# Patient Record
Sex: FEMALE | Race: WHITE | NOT HISPANIC OR LATINO | Employment: OTHER | URBAN - METROPOLITAN AREA
[De-identification: names, ages, dates, MRNs, and addresses within clinical notes are randomized per-mention and may not be internally consistent; named-entity substitution may affect disease eponyms.]

---

## 2017-04-24 ENCOUNTER — TELEPHONE (OUTPATIENT)
Dept: OBSTETRICS AND GYNECOLOGY | Facility: CLINIC | Age: 62
End: 2017-04-24

## 2017-04-24 DIAGNOSIS — Z12.31 VISIT FOR SCREENING MAMMOGRAM: Primary | ICD-10-CM

## 2017-04-24 NOTE — TELEPHONE ENCOUNTER
----- Message from Leatha Forman sent at 4/21/2017  2:40 PM EDT -----  Contact: Patient  Patient needs her routine mammo referral/order sent to [facility].     Thanks,  Leatha     Order sent to [facility]

## 2017-09-19 ENCOUNTER — OFFICE VISIT (OUTPATIENT)
Dept: OBSTETRICS AND GYNECOLOGY | Facility: CLINIC | Age: 62
End: 2017-09-19

## 2017-09-19 VITALS
BODY MASS INDEX: 21.16 KG/M2 | HEART RATE: 73 BPM | DIASTOLIC BLOOD PRESSURE: 73 MMHG | HEIGHT: 62 IN | WEIGHT: 115 LBS | SYSTOLIC BLOOD PRESSURE: 121 MMHG

## 2017-09-19 DIAGNOSIS — Z01.419 PAP SMEAR, AS PART OF ROUTINE GYNECOLOGICAL EXAMINATION: Primary | ICD-10-CM

## 2017-09-19 DIAGNOSIS — Z01.419 ENCOUNTER FOR GYNECOLOGICAL EXAMINATION WITHOUT ABNORMAL FINDING: ICD-10-CM

## 2017-09-19 DIAGNOSIS — Z12.11 COLON CANCER SCREENING: ICD-10-CM

## 2017-09-19 DIAGNOSIS — Z78.0 MENOPAUSE: ICD-10-CM

## 2017-09-19 DIAGNOSIS — M85.80 OSTEOPENIA: ICD-10-CM

## 2017-09-19 LAB — HEMOCCULT STL QL IA: NEGATIVE

## 2017-09-19 PROCEDURE — 82274 ASSAY TEST FOR BLOOD FECAL: CPT | Performed by: OBSTETRICS & GYNECOLOGY

## 2017-09-19 PROCEDURE — 99396 PREV VISIT EST AGE 40-64: CPT | Performed by: OBSTETRICS & GYNECOLOGY

## 2017-09-19 RX ORDER — MULTIVITAMIN
1 TABLET ORAL DAILY
COMMUNITY

## 2017-09-19 NOTE — PROGRESS NOTES
Subjective   Kaci Patrick is a 61 y.o. female  3, Para 2 AB 1, Living 2.  Last annual 2016, last pap >2y, last mammogram 2017, last colonoscopy recent.  DEXA-10/2016 osteopenia stable  CC: Annual exam  History of Present Illness  Denies any gynecologic problems at this time.  Review of DEXA scan showed osteopenia as noted above stable on no medications.  Explained extrapolation to lower back considering her spinal fusion and hardware and feel no therapy is indicated at this time.  Mammogram was negative in May  The following portions of the patient's history were reviewed and updated as appropriate: allergies, current medications, past family history, past medical history, past social history, past surgical history and problem list.    Review of Systems   Constitutional: Negative for activity change, fatigue, fever and unexpected weight change.   HENT: Negative for hearing loss, sore throat and voice change.    Respiratory: Negative for cough, chest tightness, shortness of breath and wheezing.    Cardiovascular: Negative for chest pain, palpitations and leg swelling.   Gastrointestinal: Negative for abdominal distention, abdominal pain, anal bleeding, blood in stool, constipation, diarrhea, nausea, rectal pain and vomiting.   Endocrine: Negative for cold intolerance and heat intolerance.   Genitourinary: Negative for difficulty urinating, dyspareunia, dysuria, flank pain, frequency, genital sores, menstrual problem, pelvic pain, urgency, vaginal bleeding, vaginal discharge and vaginal pain.   Musculoskeletal: Negative for arthralgias and back pain.   Skin: Negative for rash.   Allergic/Immunologic: Negative for environmental allergies and food allergies.   Neurological: Negative for dizziness, tremors, syncope, weakness, light-headedness, numbness and headaches.   Hematological: Negative for adenopathy. Does not bruise/bleed easily.   Psychiatric/Behavioral: Negative for agitation, behavioral problems,  "self-injury, sleep disturbance and suicidal ideas. The patient is not nervous/anxious and is not hyperactive.          Past Medical History:   Diagnosis Date   • Arthritis    • Asthma due to seasonal allergies    • Cervical dysplasia    • Osteopenia      Menstrual History:  OB History      Para Term  AB TAB SAB Ectopic Multiple Living    3 2 2  1  1   2         Menarche age:10  No LMP recorded. Patient is postmenopausal.       Past Surgical History:   Procedure Laterality Date   • APPENDECTOMY     • CERVICAL CONE BIOPSY     • CERVICAL POLYPECTOMY     • SPINAL FUSION       OB History      Para Term  AB TAB SAB Ectopic Multiple Living    3 2 2  1  1   2        Family History   Problem Relation Age of Onset   • Colon cancer Father    • Prostate cancer Father      History   Smoking Status   • Former Smoker   Smokeless Tobacco   • Never Used     History   Alcohol Use No     Health Maintenance   Topic Date Due   • TDAP/TD VACCINES (1 - Tdap) 1972   • HEPATITIS C SCREENING  10/05/2016   • ZOSTER VACCINE  10/05/2016   • PAP SMEAR  2016   • INFLUENZA VACCINE  2017   • MAMMOGRAM  2018   • DXA SCAN  10/05/2018   • COLONOSCOPY  2022       Current Outpatient Prescriptions:   •  amitriptyline (ELAVIL) 25 MG tablet, Take  by mouth., Disp: , Rfl:   •  calcium citrate-vitamin D (CITRACAL+D) 315-200 MG-UNIT per tablet, Take 1 tablet by mouth., Disp: , Rfl:   •  fluticasone (FLONASE) 50 MCG/ACT nasal spray, into each nostril., Disp: , Rfl:   •  meloxicam (MOBIC) 15 MG tablet, , Disp: , Rfl:   •  montelukast (SINGULAIR) 10 MG tablet, , Disp: , Rfl:   •  Multiple Vitamin (MULTIVITAMIN) tablet, Take 1 tablet by mouth., Disp: , Rfl:   Sexual History:Active  STD: HPV  .       Objective   Vitals:    17 1143   BP: 121/73   Pulse: 73   Weight: 115 lb (52.2 kg)   Height: 62\" (157.5 cm)     Physical Exam   Constitutional: She is oriented to person, place, and time. She appears " well-developed and well-nourished.   HENT:   Head: Normocephalic.   Eyes: Pupils are equal, round, and reactive to light.   Neck: Normal range of motion. No thyromegaly present.   Cardiovascular: Normal rate, regular rhythm, normal heart sounds and intact distal pulses.    Pulmonary/Chest: Effort normal and breath sounds normal. No respiratory distress. She exhibits no tenderness. Right breast exhibits no inverted nipple, no mass, no nipple discharge, no skin change and no tenderness. Left breast exhibits no inverted nipple, no mass, no nipple discharge, no skin change and no tenderness. Breasts are symmetrical.   Abdominal: Soft. Bowel sounds are normal. Hernia confirmed negative in the right inguinal area and confirmed negative in the left inguinal area.   Genitourinary: Rectum normal, vagina normal and uterus normal. Rectal exam shows no external hemorrhoid, no internal hemorrhoid, no fissure, no mass, no tenderness, anal tone normal and guaiac negative stool. No breast tenderness or discharge. Pelvic exam was performed with patient supine. There is no rash, tenderness, lesion or injury on the right labia. There is no rash, tenderness, lesion or injury on the left labia. Uterus is not enlarged and not tender. Cervix exhibits no motion tenderness, no discharge and no friability. Right adnexum displays no mass, no tenderness and no fullness. Left adnexum displays no mass, no tenderness and no fullness.   Lymphadenopathy:     She has no cervical adenopathy.        Right: No inguinal adenopathy present.        Left: No inguinal adenopathy present.   Neurological: She is alert and oriented to person, place, and time. She has normal reflexes.   Skin: Skin is warm and dry.   Psychiatric: She has a normal mood and affect. Her behavior is normal. Judgment and thought content normal.         Assessment/Plan   Kaci was seen today for gynecologic exam.    Diagnoses and all orders for this visit:    Pap smear, as part of  routine gynecological examination  -     IGP, Apt HPV,rfx 16 / 18,45    Menopause    Encounter for gynecological examination without abnormal finding    Osteopenia  Comments:  As her findings are stable will be no intervention at this time      Patient counseled about osteopenia, calcium usage, breast self-examination, mammograms

## 2017-09-22 LAB
CYTOLOGIST CVX/VAG CYTO: NORMAL
CYTOLOGY CVX/VAG DOC THIN PREP: NORMAL
DX ICD CODE: NORMAL
HIV 1 & 2 AB SER-IMP: NORMAL
HPV I/H RISK 4 DNA CVX QL PROBE+SIG AMP: NEGATIVE
OTHER STN SPEC: NORMAL
PATH REPORT.FINAL DX SPEC: NORMAL
STAT OF ADQ CVX/VAG CYTO-IMP: NORMAL

## 2018-06-28 ENCOUNTER — TELEPHONE (OUTPATIENT)
Dept: OBSTETRICS AND GYNECOLOGY | Facility: CLINIC | Age: 63
End: 2018-06-28

## 2018-06-28 NOTE — TELEPHONE ENCOUNTER
----- Message from Carlos Tee MD sent at 6/28/2018  1:07 PM EDT -----  Tell the patient her mammogram was negative  ----- Message -----  From: Interface, Scans Incoming  Sent: 6/28/2018   1:04 PM  To: Carlos Tee MD

## 2018-10-29 ENCOUNTER — OFFICE VISIT (OUTPATIENT)
Dept: OBSTETRICS AND GYNECOLOGY | Facility: CLINIC | Age: 63
End: 2018-10-29

## 2018-10-29 VITALS
SYSTOLIC BLOOD PRESSURE: 134 MMHG | DIASTOLIC BLOOD PRESSURE: 81 MMHG | BODY MASS INDEX: 21.16 KG/M2 | WEIGHT: 115 LBS | HEIGHT: 62 IN | HEART RATE: 86 BPM

## 2018-10-29 DIAGNOSIS — Z12.11 COLON CANCER SCREENING: Primary | ICD-10-CM

## 2018-10-29 DIAGNOSIS — Z01.419 ENCOUNTER FOR GYNECOLOGICAL EXAMINATION WITHOUT ABNORMAL FINDING: ICD-10-CM

## 2018-10-29 DIAGNOSIS — Z78.0 MENOPAUSE: ICD-10-CM

## 2018-10-29 DIAGNOSIS — N95.2 ATROPHIC VAGINITIS: ICD-10-CM

## 2018-10-29 LAB — HEMOCCULT STL QL IA: NEGATIVE

## 2018-10-29 PROCEDURE — 82274 ASSAY TEST FOR BLOOD FECAL: CPT | Performed by: OBSTETRICS & GYNECOLOGY

## 2018-10-29 PROCEDURE — 99396 PREV VISIT EST AGE 40-64: CPT | Performed by: OBSTETRICS & GYNECOLOGY

## 2018-10-29 RX ORDER — CYCLOBENZAPRINE HCL 10 MG
10 TABLET ORAL 3 TIMES DAILY PRN
COMMUNITY
Start: 2018-10-27

## 2018-10-29 RX ORDER — TRAMADOL HYDROCHLORIDE 50 MG/1
50 TABLET ORAL EVERY 12 HOURS PRN
COMMUNITY
Start: 2018-07-24

## 2019-10-31 ENCOUNTER — OFFICE VISIT (OUTPATIENT)
Dept: OBSTETRICS AND GYNECOLOGY | Facility: CLINIC | Age: 64
End: 2019-10-31

## 2019-10-31 VITALS
SYSTOLIC BLOOD PRESSURE: 118 MMHG | BODY MASS INDEX: 21.35 KG/M2 | DIASTOLIC BLOOD PRESSURE: 80 MMHG | WEIGHT: 116 LBS | HEIGHT: 62 IN

## 2019-10-31 DIAGNOSIS — Z01.419 VISIT FOR GYNECOLOGIC EXAMINATION: Primary | ICD-10-CM

## 2019-10-31 DIAGNOSIS — M85.851 OSTEOPENIA OF BOTH HIPS: ICD-10-CM

## 2019-10-31 DIAGNOSIS — Z12.11 COLON CANCER SCREENING: ICD-10-CM

## 2019-10-31 DIAGNOSIS — N95.2 ATROPHIC VAGINITIS: ICD-10-CM

## 2019-10-31 DIAGNOSIS — M85.852 OSTEOPENIA OF BOTH HIPS: ICD-10-CM

## 2019-10-31 LAB
DEVELOPER EXPIRATION DATE: NORMAL
DEVELOPER LOT NUMBER: NORMAL
EXPIRATION DATE: NORMAL
FECAL OCCULT BLOOD SCREEN, POC: NEGATIVE
Lab: NORMAL
NEGATIVE CONTROL: NEGATIVE
POSITIVE CONTROL: POSITIVE

## 2019-10-31 PROCEDURE — 82274 ASSAY TEST FOR BLOOD FECAL: CPT | Performed by: OBSTETRICS & GYNECOLOGY

## 2019-10-31 PROCEDURE — 99397 PER PM REEVAL EST PAT 65+ YR: CPT | Performed by: OBSTETRICS & GYNECOLOGY

## 2019-10-31 RX ORDER — MULTIVITAMIN
1 TABLET ORAL DAILY
COMMUNITY
End: 2020-07-02 | Stop reason: HOSPADM

## 2019-10-31 RX ORDER — CEFDINIR 300 MG/1
CAPSULE ORAL
Refills: 0 | COMMUNITY
Start: 2019-10-25 | End: 2020-09-16

## 2019-10-31 RX ORDER — TETANUS TOXOID, REDUCED DIPHTHERIA TOXOID AND ACELLULAR PERTUSSIS VACCINE, ADSORBED 5; 2.5; 8; 8; 2.5 [IU]/.5ML; [IU]/.5ML; UG/.5ML; UG/.5ML; UG/.5ML
SUSPENSION INTRAMUSCULAR
COMMUNITY
Start: 2019-08-13 | End: 2022-03-31

## 2019-10-31 RX ORDER — ZOSTER VACCINE RECOMBINANT, ADJUVANTED 50 MCG/0.5
KIT INTRAMUSCULAR
COMMUNITY
Start: 2019-08-13 | End: 2022-03-31

## 2019-10-31 RX ORDER — ESTRADIOL 0.1 MG/G
2 CREAM VAGINAL DAILY
Qty: 42.5 G | Refills: 5 | Status: SHIPPED | OUTPATIENT
Start: 2019-10-31 | End: 2021-12-21 | Stop reason: SDUPTHER

## 2019-10-31 RX ORDER — GUAIFENESIN 600 MG/1
600 TABLET, EXTENDED RELEASE ORAL EVERY 12 HOURS PRN
Refills: 0 | COMMUNITY
Start: 2019-10-25 | End: 2020-11-04

## 2019-10-31 NOTE — PROGRESS NOTES
[facility]  [address], [address]  [place], [place] [address]  Phone: [phone] / Fax:  [phone]      10/31/2019    [address] [address]    Jarod Hair MD    Chief Complaint   Patient presents with   • Gynecologic Exam     Annual Exam, last pap -17 nl hpv (-), mammogram 08/15/2019, colonoscopy .       Kaci Patrick is here for annual gynecologic exam.  HPI - Patient had mammogram within past 3 months and was normal.  Colonoscopy last done in ; patient believes it was done at [facility] but is unsure of physician who completed it.  She reports that she was told her prep was inadequate and she would need to do one sooner than 10 years.  Her father was diagnosed with colon cancer.  In addition, patient has been on estrace cream in past for vaginal atrophy but has not used it in a while.  She is requesting refill because of dryness with intercourse.    Past Medical History:   Diagnosis Date   • Arthritis    • Asthma due to seasonal allergies    • Cervical dysplasia    • Insomnia    • Osteopenia        Past Surgical History:   Procedure Laterality Date   • APPENDECTOMY     • CERVICAL CONE BIOPSY     • CERVICAL POLYPECTOMY     • SPINAL FUSION         Allergies   Allergen Reactions   • No Known Drug Allergy        Social History     Socioeconomic History   • Marital status: Unknown     Spouse name: Not on file   • Number of children: Not on file   • Years of education: Not on file   • Highest education level: Not on file   Tobacco Use   • Smoking status: Former Smoker     Packs/day: 1.00     Years: 25.00     Pack years: 25.00     Types: Cigarettes     Last attempt to quit: 10/29/2003     Years since quittin.0   • Smokeless tobacco: Never Used   Substance and Sexual Activity   • Alcohol use: No   • Drug use: No   • Sexual activity: Yes     Partners: Male     Birth control/protection: Post-menopausal       Family History   Problem Relation Age of Onset   •  "Colon cancer Father 87   • Prostate cancer Father 65   • Kidney disease Mother        No LMP recorded. Patient is postmenopausal.    OB History      Para Term  AB Living    3 2 2   1 2    SAB TAB Ectopic Molar Multiple Live Births    1                    Vitals:    10/31/19 1022   BP: 118/80   Weight: 52.6 kg (116 lb)   Height: 157.5 cm (62\")       Physical Exam   Constitutional: She appears well-developed and well-nourished.   Genitourinary: Rectum normal, vagina normal and uterus normal. Pelvic exam was performed with patient supine. There is no tenderness or lesion on the right labia. There is no tenderness or lesion on the left labia. Right adnexum does not display tenderness and does not display fullness. Left adnexum does not display tenderness and does not display fullness. Cervix does not exhibit motion tenderness or lesion. Rectal exam shows guaiac negative stool.   HENT:   Right Ear: External ear normal.   Left Ear: External ear normal.   Nose: Nose normal.   Eyes: Conjunctivae are normal.   Neck: Normal range of motion. Neck supple. No thyromegaly present.   Cardiovascular: Normal rate, regular rhythm and normal heart sounds.   Pulmonary/Chest: Effort normal. No stridor. She has no wheezes. Right breast exhibits no mass and no nipple discharge. Left breast exhibits no mass and no nipple discharge.   Abdominal: Soft. There is no tenderness. There is no guarding.   Musculoskeletal: Normal range of motion. She exhibits no edema.   Neurological: She is alert. Coordination normal.   Skin: Skin is warm and dry.   Psychiatric: She has a normal mood and affect. Her behavior is normal. Judgment and thought content normal.   Vitals reviewed.      Kaci was seen today for gynecologic exam.    Diagnoses and all orders for this visit:    Visit for gynecologic examination        -     Discussed importance of regular screening and breast awareness.  Screening up to date.  Colon cancer screening  -     " Ambulatory Referral to General Surgery  Osteopenia        -     Patient to do DEXA scan.  Atrophic vaginitis  -     estradiol (ESTRACE VAGINAL) 0.1 MG/GM vaginal cream; Insert 2 g into the vagina Daily.        Guille Frazier MD

## 2019-11-05 LAB
CYTOLOGIST CVX/VAG CYTO: NORMAL
CYTOLOGY CVX/VAG DOC CYTO: NORMAL
CYTOLOGY CVX/VAG DOC THIN PREP: NORMAL
DX ICD CODE: NORMAL
HIV 1 & 2 AB SER-IMP: NORMAL
HPV I/H RISK 4 DNA CVX QL PROBE+SIG AMP: NEGATIVE
OTHER STN SPEC: NORMAL
STAT OF ADQ CVX/VAG CYTO-IMP: NORMAL

## 2019-11-07 ENCOUNTER — TELEPHONE (OUTPATIENT)
Dept: OBSTETRICS AND GYNECOLOGY | Facility: CLINIC | Age: 64
End: 2019-11-07

## 2019-12-18 ENCOUNTER — PROCEDURE VISIT (OUTPATIENT)
Dept: OBSTETRICS AND GYNECOLOGY | Facility: CLINIC | Age: 64
End: 2019-12-18

## 2019-12-18 DIAGNOSIS — Z78.0 POST-MENOPAUSAL: ICD-10-CM

## 2019-12-18 DIAGNOSIS — Z00.00 PREVENTATIVE HEALTH CARE: Primary | ICD-10-CM

## 2019-12-18 DIAGNOSIS — Z82.62 FAMILY HISTORY OF OSTEOPOROSIS: ICD-10-CM

## 2019-12-18 DIAGNOSIS — E89.40 POSTSURGICAL MENOPAUSE: ICD-10-CM

## 2019-12-18 DIAGNOSIS — Z87.81 HISTORY OF FRACTURE: ICD-10-CM

## 2019-12-18 PROCEDURE — 77080 DXA BONE DENSITY AXIAL: CPT | Performed by: OBSTETRICS & GYNECOLOGY

## 2019-12-26 ENCOUNTER — TELEPHONE (OUTPATIENT)
Dept: OBSTETRICS AND GYNECOLOGY | Facility: CLINIC | Age: 64
End: 2019-12-26

## 2019-12-26 RX ORDER — IBANDRONATE SODIUM 150 MG/1
150 TABLET, FILM COATED ORAL
Qty: 1 TABLET | Refills: 11 | Status: SHIPPED | OUTPATIENT
Start: 2019-12-26 | End: 2020-11-04 | Stop reason: SDUPTHER

## 2019-12-26 NOTE — TELEPHONE ENCOUNTER
Patient aware of osteopenia.  It is more severe form of osteopenia and she is already taking calcium and Vitamin D.  I recommend going back on bisphosphonates.  She has not taken them in many years and she tolerated them well.  Will start Boniva.  Patient to continue taking Calcium and Vitamin D.

## 2020-01-09 ENCOUNTER — TELEPHONE (OUTPATIENT)
Dept: OBSTETRICS AND GYNECOLOGY | Facility: CLINIC | Age: 65
End: 2020-01-09

## 2020-01-09 NOTE — TELEPHONE ENCOUNTER
Patient called she said she called pharmacy they told her they did not have her prescription for ibandronate (BONIVA) 150 MG tablet    I called pharmacy to check for her they had taken off the shelf because it had been past the 9 day gabriela from when it was prescribed. But prescription is still active so they will have ready for patient to  today. Please let patient know if she calls back I could not get thru line to leave message.

## 2020-03-12 ENCOUNTER — PREP FOR SURGERY (OUTPATIENT)
Dept: OTHER | Facility: HOSPITAL | Age: 65
End: 2020-03-12

## 2020-03-12 DIAGNOSIS — Z80.0 FAMILY HISTORY OF COLON CANCER IN FATHER: ICD-10-CM

## 2020-03-12 DIAGNOSIS — Z12.11 SCREENING FOR COLON CANCER: Primary | ICD-10-CM

## 2020-05-07 ENCOUNTER — TELEPHONE (OUTPATIENT)
Dept: OBSTETRICS AND GYNECOLOGY | Facility: CLINIC | Age: 65
End: 2020-05-07

## 2020-05-07 NOTE — TELEPHONE ENCOUNTER
Patients dentist office called and is asking that patient take a 3 month vacation from [facility] so they can extract a tooth from her.    Fax   [phone]

## 2020-05-08 NOTE — TELEPHONE ENCOUNTER
Monik    Yes.  You can fax them a note.  Please let the patient know as well.    Thanks    Freddie

## 2020-05-11 ENCOUNTER — TELEPHONE (OUTPATIENT)
Dept: OBSTETRICS AND GYNECOLOGY | Facility: CLINIC | Age: 65
End: 2020-05-11

## 2020-06-15 PROBLEM — Z12.11 SCREENING FOR COLON CANCER: Status: ACTIVE | Noted: 2020-06-15

## 2020-06-15 PROBLEM — Z80.0 FAMILY HISTORY OF COLON CANCER IN FATHER: Status: ACTIVE | Noted: 2020-06-15

## 2020-06-26 ENCOUNTER — TRANSCRIBE ORDERS (OUTPATIENT)
Dept: ADMINISTRATIVE | Facility: HOSPITAL | Age: 65
End: 2020-06-26

## 2020-06-26 DIAGNOSIS — Z01.818 OTHER SPECIFIED PRE-OPERATIVE EXAMINATION: Primary | ICD-10-CM

## 2020-06-30 ENCOUNTER — LAB (OUTPATIENT)
Dept: LAB | Facility: HOSPITAL | Age: 65
End: 2020-06-30

## 2020-06-30 DIAGNOSIS — Z01.818 OTHER SPECIFIED PRE-OPERATIVE EXAMINATION: ICD-10-CM

## 2020-06-30 PROCEDURE — C9803 HOPD COVID-19 SPEC COLLECT: HCPCS

## 2020-06-30 PROCEDURE — U0004 COV-19 TEST NON-CDC HGH THRU: HCPCS

## 2020-07-01 LAB
REF LAB TEST METHOD: NORMAL
SARS-COV-2 RNA RESP QL NAA+PROBE: NOT DETECTED

## 2020-07-02 ENCOUNTER — ANESTHESIA EVENT (OUTPATIENT)
Dept: GASTROENTEROLOGY | Facility: HOSPITAL | Age: 65
End: 2020-07-02

## 2020-07-02 ENCOUNTER — ANESTHESIA (OUTPATIENT)
Dept: GASTROENTEROLOGY | Facility: HOSPITAL | Age: 65
End: 2020-07-02

## 2020-07-02 ENCOUNTER — HOSPITAL ENCOUNTER (OUTPATIENT)
Facility: HOSPITAL | Age: 65
Setting detail: HOSPITAL OUTPATIENT SURGERY
Discharge: HOME | End: 2020-07-02
Attending: SURGERY | Admitting: SURGERY

## 2020-07-02 VITALS
HEIGHT: 62 IN | HEART RATE: 79 BPM | DIASTOLIC BLOOD PRESSURE: 67 MMHG | WEIGHT: 110 LBS | TEMPERATURE: 97.7 F | BODY MASS INDEX: 20.24 KG/M2 | RESPIRATION RATE: 15 BRPM | OXYGEN SATURATION: 98 % | SYSTOLIC BLOOD PRESSURE: 107 MMHG

## 2020-07-02 PROCEDURE — S0260 H&P FOR SURGERY: HCPCS | Performed by: SURGERY

## 2020-07-02 PROCEDURE — 25010000002 PROPOFOL 10 MG/ML EMULSION: Performed by: NURSE ANESTHETIST, CERTIFIED REGISTERED

## 2020-07-02 PROCEDURE — 45378 DIAGNOSTIC COLONOSCOPY: CPT | Performed by: SURGERY

## 2020-07-02 PROCEDURE — 25010000002 PHENYLEPHRINE PER 1 ML: Performed by: NURSE ANESTHETIST, CERTIFIED REGISTERED

## 2020-07-02 RX ORDER — LIDOCAINE HYDROCHLORIDE 20 MG/ML
INJECTION, SOLUTION INFILTRATION; PERINEURAL AS NEEDED
Status: DISCONTINUED | OUTPATIENT
Start: 2020-07-02 | End: 2020-07-02 | Stop reason: SURG

## 2020-07-02 RX ORDER — PROPOFOL 10 MG/ML
VIAL (ML) INTRAVENOUS AS NEEDED
Status: DISCONTINUED | OUTPATIENT
Start: 2020-07-02 | End: 2020-07-02 | Stop reason: SURG

## 2020-07-02 RX ORDER — SODIUM CHLORIDE, SODIUM LACTATE, POTASSIUM CHLORIDE, CALCIUM CHLORIDE 600; 310; 30; 20 MG/100ML; MG/100ML; MG/100ML; MG/100ML
30 INJECTION, SOLUTION INTRAVENOUS CONTINUOUS PRN
Status: DISCONTINUED | OUTPATIENT
Start: 2020-07-02 | End: 2020-07-02 | Stop reason: HOSPADM

## 2020-07-02 RX ORDER — PROPOFOL 10 MG/ML
VIAL (ML) INTRAVENOUS CONTINUOUS PRN
Status: DISCONTINUED | OUTPATIENT
Start: 2020-07-02 | End: 2020-07-02 | Stop reason: SURG

## 2020-07-02 RX ORDER — CELECOXIB 100 MG/1
100 CAPSULE ORAL 2 TIMES DAILY PRN
COMMUNITY
End: 2020-09-16

## 2020-07-02 RX ADMIN — PROPOFOL 250 MCG/KG/MIN: 10 INJECTION, EMULSION INTRAVENOUS at 09:26

## 2020-07-02 RX ADMIN — PHENYLEPHRINE HYDROCHLORIDE 100 MCG: 10 INJECTION INTRAVENOUS at 09:57

## 2020-07-02 RX ADMIN — PROPOFOL 100 MG: 10 INJECTION, EMULSION INTRAVENOUS at 09:26

## 2020-07-02 RX ADMIN — SODIUM CHLORIDE, POTASSIUM CHLORIDE, SODIUM LACTATE AND CALCIUM CHLORIDE 30 ML/HR: 600; 310; 30; 20 INJECTION, SOLUTION INTRAVENOUS at 08:45

## 2020-07-02 RX ADMIN — LIDOCAINE HYDROCHLORIDE 60 MG: 20 INJECTION, SOLUTION INFILTRATION; PERINEURAL at 09:26

## 2020-07-02 NOTE — ANESTHESIA PREPROCEDURE EVALUATION
Anesthesia Evaluation     Patient summary reviewed and Nursing notes reviewed   NPO Solid Status: > 8 hours  NPO Liquid Status: > 4 hours           Airway   Mallampati: II  TM distance: >3 FB  Neck ROM: full  No difficulty expected  Dental - normal exam     Comment: Permanent bridge    Pulmonary - normal exam   (+) asthma,    ROS comment: Seasonal allergies  Cardiovascular - negative cardio ROS and normal exam    Rhythm: regular  Rate: normal        Neuro/Psych- negative ROS  GI/Hepatic/Renal/Endo - negative ROS     Musculoskeletal     Abdominal  - normal exam   Substance History - negative use     OB/GYN negative ob/gyn ROS         Other   arthritis,                    Anesthesia Plan    ASA 2     MAC     intravenous induction     Anesthetic plan, all risks, benefits, and alternatives have been provided, discussed and informed consent has been obtained with: patient.

## 2020-07-02 NOTE — DISCHARGE INSTRUCTIONS
For the next 24 hours patient needs to be with a responsible adult.    For 24 hours DO NOT drive, operate machinery, appliances, drink alcohol, make important decisions or sign legal documents.    Start with a light or bland diet if you are feeling sick to your stomach otherwise advance to regular diet as tolerated.    Follow recommendations on procedure report if provided by your doctor.    Call Dr Cavanaugh for problems [phone]    Problems may include but not limited to: large amounts of bleeding, trouble breathing, repeated vomiting, severe unrelieved pain, fever or chills.

## 2020-07-02 NOTE — OP NOTE
Operative Note :  Roseanne Cavanaugh MD      Kaci Patrick  1955    Procedure Date: 07/02/20    Pre-op Diagnosis:  Screening for colon cancer [Z12.11]  Family history of colon cancer in father [Z80.0]    Post-Operative Diagnosis:  Poor bowel prep    Procedure:   Flexible colonoscopy to the cecum     Surgeon: Roseanne Cavanaugh MD    Assistant: None    Anesthesia:  MAC (monitored anesthetic care)    Estimated Blood Loss: Minimal    Specimens: None    Complications: None    Indications:  Mrs. Patrick is a 64-year-old lady here for a repeat screening colonoscopy.  She has been counseled on the risks of the procedure to include bleeding, possible colon perforation, and possible missed pathology.  Despite these risks, she has consented to proceed.    Findings: Poor bowel prep    Description of procedure:  The patient was brought to the endoscopy suite and aditi in the left lateral decubitus position.  Continuous propofol anesthesia was administered.  A surgical timeout was completed.  A digital rectal exam was performed, revealing no abnormalities.  An adult colonoscope was then inserted through the anus and passed under direct visualization to the level of the cecum.  The cecum was identified via the ileocecal valve as well as the appendiceal orifice.  The scope was then slowly withdrawn, examining all circumferential walls of the ascending, transverse, descending, and sigmoid colon. She had a very poor bowel prep with significant liquid and adherent semi-solid stool throughout the entire colon. I copiously irrigated this away and was able to see about 90% of the colonic mucosa, noting no obvious abnormalities. Within the rectum the scope was retroflexed, noting no signs of hemorrhoidal disease. The scope was then withdrawn and the colon desufflated.  The patient was transferred to the recovery area in stable condition.     Recommendations:  Repeat colonoscopy in 5 years given family history of colon cancer in  her father.    Roseanne Cavanaugh MD  General and Endoscopic Surgery  [facility]    [address], [address]  [place], [place], [address]  P: [phone]  F: [phone]

## 2020-07-02 NOTE — H&P
General Surgery  History and Physical    CC: Screening for colon cancer, family history of colon cancer    HPI: The patient is a pleasant 64 y.o. year-old lady who presents today for a repeat screening colonoscopy.  Her last colonoscopy was done in 2014.  She denies any melena or hematochezia and has had no unintentional weight loss.  Her father had a history of prostate cancer and colon cancer.    Past Medical History:   Osteoarthritis  Cervical dysplasia  Osteopenia  Asthma secondary to seasonal allergies    Past Surgical History:   Appendectomy  Cervical polypectomy  Cervical cone biopsy  Spine fusion  Colonoscopy (2014)    Medications:   Medications Prior to Admission   Medication Sig Dispense Refill Last Dose   • amitriptyline (ELAVIL) 25 MG tablet Take  by mouth.   7/1/2020 at Unknown time   • calcium citrate-vitamin D (CITRACAL+D) 315-200 MG-UNIT per tablet Take 1 tablet by mouth.   7/1/2020 at Unknown time   • celecoxib (CeleBREX) 100 MG capsule Take 100 mg by mouth 2 (Two) Times a Day As Needed for Mild Pain .   6/27/2020   • cyclobenzaprine (FLEXERIL) 10 MG tablet    Past Week at Unknown time   • fluticasone (FLONASE) 50 MCG/ACT nasal spray into each nostril.   7/1/2020 at Unknown time   • ibandronate (BONIVA) 150 MG tablet Take 1 tablet by mouth Every 30 (Thirty) Days. 1 tablet 11 Past Month at Unknown time   • montelukast (SINGULAIR) 10 MG tablet    7/1/2020 at Unknown time   • Multiple Vitamin (MULTIVITAMIN) tablet Take 1 tablet by mouth.   7/1/2020 at Unknown time   • traMADol (ULTRAM) 50 MG tablet    7/1/2020 at Unknown time   • BOOSTRIX 5-2.5-18.5 LF-MCG/0.5 injection    More than a month at Unknown time   • calcium citrate-vitamin D (CITRACAL+D) 315-200 MG-UNIT per tablet Take 1 tablet by mouth.   Taking   • cefdinir (OMNICEF) 300 MG capsule TAKE 1 CAPSULE BY MOUTH EVERY 12 HOURS FOR INFECTION  0 More than a month at Unknown time   • EQ MUCUS  MG 12 hr tablet Take 600 mg by mouth Every 12  (Twelve) Hours As Needed.  0 More than a month at Unknown time   • estradiol (ESTRACE VAGINAL) 0.1 MG/GM vaginal cream Insert 2 g into the vagina Daily. 42.5 g 5 More than a month at Unknown time   • meloxicam (MOBIC) 15 MG tablet    Taking   • Multiple Vitamin (MULTIVITAMIN) tablet Take 1 tablet by mouth Daily.   Taking   • SHINGRIX 50 MCG/0.5ML reconstituted suspension    More than a month at Unknown time       Allergies: No known drug allergies    Family History: Father with history of colon cancer and prostate cancer, mother with chronic kidney disease    Social History: , works as a farmer, former smoker but quit in 2003, no regular alcohol use    ROS: A comprehensive review of systems was conducted and significant only for the following positives: Environmental allergies, joint pain, and back pain  All other systems reviewed and negative    Physical Exam:  Vitals:    07/02/20 0828   BP: 138/82   Pulse: 87   Resp: 15   Temp: 97.7 °F (36.5 °C)   SpO2: 99%     Height: 157 cm  Weight: 49 kg  BMI: 20  General: No acute distress, well-nourished & well-developed  HEAD: normocephalic, atraumatic  EYES: normal conjunctiva, sclera anicteric  EARS: grossly normal hearing  NECK: supple, no thyromegaly  CARDIOVASCULAR: regular rate and rhythm  RESPIRATORY: clear to auscultation bilaterally  GASTROINTESTINAL: soft, nontender, non-distended  PSYCHIATRIC: oriented x3, normal mood and affect    ASSESSMENT & PLAN  Mrs. Patrick is a 64-year-old lady here for a repeat screening colonoscopy.  She has been counseled on the risks of the procedure to include bleeding, possible colon perforation, and possible missed pathology.  Despite these risks, she has consented to proceed.    Roseanne Cavanaugh MD  General and Endoscopic Surgery  [facility]    [address], [address]  [place], [place], [address]  P: [phone]  F: [phone]

## 2020-11-04 ENCOUNTER — OFFICE VISIT (OUTPATIENT)
Dept: OBSTETRICS AND GYNECOLOGY | Facility: CLINIC | Age: 65
End: 2020-11-04

## 2020-11-04 VITALS
WEIGHT: 115 LBS | BODY MASS INDEX: 21.16 KG/M2 | DIASTOLIC BLOOD PRESSURE: 80 MMHG | SYSTOLIC BLOOD PRESSURE: 120 MMHG | HEIGHT: 62 IN

## 2020-11-04 DIAGNOSIS — M85.851 OSTEOPENIA OF BOTH HIPS: ICD-10-CM

## 2020-11-04 DIAGNOSIS — M85.852 OSTEOPENIA OF BOTH HIPS: ICD-10-CM

## 2020-11-04 DIAGNOSIS — Z01.419 VISIT FOR GYNECOLOGIC EXAMINATION: Primary | ICD-10-CM

## 2020-11-04 PROCEDURE — 99397 PER PM REEVAL EST PAT 65+ YR: CPT | Performed by: OBSTETRICS & GYNECOLOGY

## 2020-11-04 RX ORDER — SODIUM FLUORIDE 6 MG/ML
1 PASTE, DENTIFRICE DENTAL SEE ADMIN INSTRUCTIONS
COMMUNITY
Start: 2020-09-16

## 2020-11-04 RX ORDER — IBANDRONATE SODIUM 150 MG/1
150 TABLET, FILM COATED ORAL
Qty: 3 TABLET | Refills: 3 | Status: SHIPPED | OUTPATIENT
Start: 2020-11-04 | End: 2021-12-20

## 2020-11-04 RX ORDER — BETAMETHASONE DIPROPIONATE 0.5 MG/G
1 CREAM TOPICAL 2 TIMES DAILY PRN
COMMUNITY
Start: 2020-09-30

## 2020-11-05 NOTE — PROGRESS NOTES
[facility]  [address], [address]  [place], [place] [address]  Phone: [phone] / Fax:  [phone]      2020    [address] [facility] [address]    Jarod Hair MD    Chief Complaint   Patient presents with   • Gynecologic Exam     Annual Exam, last pap 10-31-19 NL HPV (-), Mammogram 24-20 NL., Colonoscopy --20 NL.. Dexa -19 Osteopenia.       Kaci Patrick is here for annual gynecologic exam.  HPI - Patient had a mammogram in September that was normal.  Colonoscopy was in 2020 and was normal.  Patient takes Boniva for osteopenia; she also takes Vitamin D and Calcium.      Past Medical History:   Diagnosis Date   • Arthritis    • Asthma due to seasonal allergies    • Cervical dysplasia    • Insomnia    • Osteopenia        Past Surgical History:   Procedure Laterality Date   • APPENDECTOMY     • CERVICAL CONE BIOPSY     • CERVICAL POLYPECTOMY     • COLONOSCOPY N/A 2020    Procedure: COLONOSCOPY TO CECUM;  Surgeon: Roseanne Cavanaugh MD;  Location: [facility] END[facility]COPY;  Service: General;  Laterality: N/A;  PRE: SCREENING AND FAMILY H/O COLON CANCER  POST: POOR PREP   • DILATATION AND CURETTAGE     • SPINAL FUSION         Allergies   Allergen Reactions   • No Known Drug Allergy        Social History     Socioeconomic History   • Marital status: Unknown     Spouse name: Not on file   • Number of children: Not on file   • Years of education: Not on file   • Highest education level: Not on file   Tobacco Use   • Smoking status: Former Smoker     Packs/day: 1.00     Years: 25.00     Pack years: 25.00     Types: Cigarettes     Quit date: 10/29/2003     Years since quittin.0   • Smokeless tobacco: Never Used   Substance and Sexual Activity   • Alcohol use: No   • Drug use: No   • Sexual activity: Yes     Partners: Male     Birth control/protection: Post-menopausal       Family History   Problem Relation Age of Onset   • Colon cancer Father 87   • Prostate cancer  "Father 65   • Kidney disease Mother        No LMP recorded. Patient is postmenopausal.    OB History        3    Para   2    Term   2            AB   1    Living   2       SAB   1    TAB        Ectopic        Molar        Multiple        Live Births                    Vitals:    20 1307   BP: 120/80   Weight: 52.2 kg (115 lb)   Height: 157.5 cm (62\")       Physical Exam  Constitutional:       Appearance: She is well-developed.   Genitourinary:      Pelvic exam was performed with patient in the lithotomy position.      Urethra, bladder, vagina and uterus normal.      No cervical motion tenderness or lesion.      Right adnexa not tender or full.      Left adnexa not tender or full.   HENT:      Right Ear: External ear normal.      Left Ear: External ear normal.      Nose: Nose normal.   Eyes:      Conjunctiva/sclera: Conjunctivae normal.   Neck:      Musculoskeletal: Normal range of motion and neck supple.      Thyroid: No thyromegaly.   Cardiovascular:      Rate and Rhythm: Normal rate and regular rhythm.      Heart sounds: Normal heart sounds.   Pulmonary:      Effort: Pulmonary effort is normal.      Breath sounds: No stridor. No wheezing.   Chest:      Breasts:         Right: No mass or nipple discharge.         Left: No mass or nipple discharge.   Abdominal:      Palpations: Abdomen is soft. There is no mass.      Tenderness: There is no guarding or rebound.   Musculoskeletal: Normal range of motion.   Neurological:      Mental Status: She is alert.      Coordination: Coordination normal.   Skin:     General: Skin is warm and dry.   Psychiatric:         Behavior: Behavior normal.         Thought Content: Thought content normal.         Judgment: Judgment normal.   Vitals signs and nursing note reviewed. Exam conducted with a chaperone present.         Diagnoses and all orders for this visit:    1. Visit for gynecologic examination (Primary)        -     Discussed importance of regular " screening and breast awareness.  2. Osteopenia of both hips  -     ibandronate (Boniva) 150 MG tablet; Take 1 tablet by mouth Every 30 (Thirty) Days.  Dispense: 3 tablet; Refill: 3  -     Discussed importance of taking Calcium and Vitamin D.  Patient to repeat DEXA in 2 years.        Guille Frazier MD

## 2021-03-22 ENCOUNTER — BULK ORDERING (OUTPATIENT)
Dept: CASE MANAGEMENT | Facility: OTHER | Age: 66
End: 2021-03-22

## 2021-03-22 DIAGNOSIS — Z23 IMMUNIZATION DUE: ICD-10-CM

## 2021-12-20 ENCOUNTER — OFFICE VISIT (OUTPATIENT)
Dept: OBSTETRICS AND GYNECOLOGY | Facility: CLINIC | Age: 66
End: 2021-12-20

## 2021-12-20 VITALS
HEIGHT: 60 IN | DIASTOLIC BLOOD PRESSURE: 83 MMHG | BODY MASS INDEX: 21.99 KG/M2 | SYSTOLIC BLOOD PRESSURE: 142 MMHG | WEIGHT: 112 LBS

## 2021-12-20 DIAGNOSIS — M85.852 OSTEOPENIA OF BOTH HIPS: ICD-10-CM

## 2021-12-20 DIAGNOSIS — Z01.419 VISIT FOR GYNECOLOGIC EXAMINATION: ICD-10-CM

## 2021-12-20 DIAGNOSIS — Z12.11 COLON CANCER SCREENING: Primary | ICD-10-CM

## 2021-12-20 DIAGNOSIS — N95.2 ATROPHIC VAGINITIS: ICD-10-CM

## 2021-12-20 DIAGNOSIS — M85.851 OSTEOPENIA OF BOTH HIPS: ICD-10-CM

## 2021-12-20 PROCEDURE — 82274 ASSAY TEST FOR BLOOD FECAL: CPT | Performed by: OBSTETRICS & GYNECOLOGY

## 2021-12-20 PROCEDURE — 99397 PER PM REEVAL EST PAT 65+ YR: CPT | Performed by: OBSTETRICS & GYNECOLOGY

## 2021-12-20 RX ORDER — IBANDRONATE SODIUM 150 MG/1
TABLET, FILM COATED ORAL
Qty: 3 TABLET | Refills: 3 | Status: SHIPPED | OUTPATIENT
Start: 2021-12-20 | End: 2021-12-21 | Stop reason: SDUPTHER

## 2021-12-20 NOTE — PROGRESS NOTES
[facility]  [address], [address]  [place], [place] [address]  Phone: [phone] / Fax:  [phone]      2021    [address] [facility] [address]    Jarod Hair MD    Chief Complaint   Patient presents with   • Gynecologic Exam     Annual Exam, last pap 10-31-19 NL HPV (-), Mammogram 1 month ago Nl per patient, Colonoscopy 7--20 NL.. Dexa - Osteopenia.       Kaci Patrick is here for annual gynecologic exam.  HPI - Patient with pap 2 years ago that was normal.  Patient had normal mammogram one month ago.  Her last colonoscopy was one year ago and was normal.  Patient is on Boniva for osteopenia.    Past Medical History:   Diagnosis Date   • Arthritis    • Asthma due to seasonal allergies    • Cervical dysplasia    • Insomnia    • Osteopenia        Past Surgical History:   Procedure Laterality Date   • APPENDECTOMY     • CERVICAL CONE BIOPSY     • CERVICAL POLYPECTOMY     • COLONOSCOPY N/A 2020    Procedure: COLONOSCOPY TO CECUM;  Surgeon: Roseanne Cavanaugh MD;  Location: [facility] ENDOSCOPY;  Service: General;  Laterality: N/A;  PRE: SCREENING AND FAMILY H/O COLON CANCER  POST: POOR PREP   • DILATATION AND CURETTAGE     • SPINAL FUSION         No Known Allergies    Social History     Socioeconomic History   • Marital status: Unknown   Tobacco Use   • Smoking status: Former Smoker     Packs/day: 1.00     Years: 25.00     Pack years: 25.00     Types: Cigarettes     Quit date: 10/29/2003     Years since quittin.1   • Smokeless tobacco: Never Used   Substance and Sexual Activity   • Alcohol use: No   • Drug use: No   • Sexual activity: Yes     Partners: Male     Birth control/protection: Post-menopausal       Family History   Problem Relation Age of Onset   • Colon cancer Father 87   • Prostate cancer Father 65   • Kidney disease Mother    • Breast cancer Neg Hx        No LMP recorded (lmp unknown). Patient is postmenopausal.    OB History        3    Para  "  2    Term   2            AB   1    Living   2       SAB   1    IAB        Ectopic        Molar        Multiple        Live Births                    Vitals:    21 1516   BP: 142/83   Weight: 50.8 kg (112 lb)   Height: 152.4 cm (60\")       Physical Exam  Constitutional:       Appearance: Normal appearance. She is well-developed.   Genitourinary:      Bladder, rectum and urethral meatus normal.      Right Labia: No rash or tenderness.     Left Labia: No tenderness or rash.     No vaginal discharge or tenderness.        Right Adnexa: not tender and not full.     Left Adnexa: not tender and not full.     No cervical motion tenderness or lesion.      Uterus is not enlarged or tender.      No urethral tenderness or mass present.   Rectum:      No rectal mass or tenderness.   Breasts:      Right: No mass or nipple discharge.      Left: No mass or nipple discharge.       HENT:      Right Ear: External ear normal.      Left Ear: External ear normal.      Nose: Nose normal.   Eyes:      Conjunctiva/sclera: Conjunctivae normal.   Neck:      Thyroid: No thyromegaly.   Cardiovascular:      Rate and Rhythm: Normal rate and regular rhythm.      Heart sounds: Normal heart sounds.   Pulmonary:      Effort: Pulmonary effort is normal.      Breath sounds: No stridor. No wheezing.   Abdominal:      Palpations: Abdomen is soft. There is no mass.      Tenderness: There is no guarding or rebound.   Musculoskeletal:         General: Normal range of motion.      Cervical back: Normal range of motion and neck supple.   Neurological:      Mental Status: She is alert.      Coordination: Coordination normal.   Skin:     General: Skin is warm and dry.   Psychiatric:         Mood and Affect: Mood normal.         Behavior: Behavior normal.         Thought Content: Thought content normal.         Judgment: Judgment normal.   Vitals reviewed. Exam conducted with a chaperone present.         Diagnoses and all orders for this " visit:    1. Colon cancer screening (Primary)  -     POC Occult Blood Stool  -    Screening up to date.    2. Visit for gynecologic examination        -     Discussed importance of regular screening and breast awareness.    3. Osteopenia of both hips  -     ibandronate (BONIVA) 150 MG tablet; Take 1 tablet by mouth Every 30 (Thirty) Days.  Dispense: 3 tablet; Refill: 3  -     Patient to repeat DEXA in one year.    4. Atrophic vaginitis  -     estradiol (ESTRACE VAGINAL) 0.1 MG/GM vaginal cream; Insert 2 g into the vagina Daily.  Dispense: 42.5 g; Refill: 5        Guille Frazier MD

## 2021-12-20 NOTE — TELEPHONE ENCOUNTER
Med refill. AE today, 12/20/2021 at 3 PM. [facility] [place], [place] on file. Thank you

## 2021-12-21 RX ORDER — IBANDRONATE SODIUM 150 MG/1
150 TABLET, FILM COATED ORAL
Qty: 3 TABLET | Refills: 3 | Status: SHIPPED | OUTPATIENT
Start: 2021-12-21 | End: 2023-03-10 | Stop reason: SDUPTHER

## 2021-12-21 RX ORDER — ESTRADIOL 0.1 MG/G
2 CREAM VAGINAL DAILY
Qty: 42.5 G | Refills: 5 | Status: SHIPPED | OUTPATIENT
Start: 2021-12-21

## 2022-03-31 ENCOUNTER — HOSPITAL ENCOUNTER (OUTPATIENT)
Dept: GENERAL RADIOLOGY | Facility: HOSPITAL | Age: 67
Discharge: HOME | End: 2022-03-31

## 2022-03-31 ENCOUNTER — PRE-ADMISSION TESTING (OUTPATIENT)
Dept: PREADMISSION TESTING | Facility: HOSPITAL | Age: 67
End: 2022-03-31

## 2022-03-31 VITALS
HEART RATE: 97 BPM | SYSTOLIC BLOOD PRESSURE: 160 MMHG | OXYGEN SATURATION: 97 % | TEMPERATURE: 98 F | DIASTOLIC BLOOD PRESSURE: 80 MMHG | HEIGHT: 60 IN | BODY MASS INDEX: 21.26 KG/M2 | WEIGHT: 108.3 LBS | RESPIRATION RATE: 16 BRPM

## 2022-03-31 LAB
ALBUMIN SERPL-MCNC: 4.5 G/DL (ref 3.5–5.2)
ALBUMIN/GLOB SERPL: 1.7 G/DL
ALP SERPL-CCNC: 73 U/L (ref 39–117)
ALT SERPL W P-5'-P-CCNC: 23 U/L (ref 1–33)
ANION GAP SERPL CALCULATED.3IONS-SCNC: 9.2 MMOL/L (ref 5–15)
AST SERPL-CCNC: 25 U/L (ref 1–32)
BACTERIA UR QL AUTO: ABNORMAL /HPF
BILIRUB SERPL-MCNC: 0.3 MG/DL (ref 0–1.2)
BILIRUB UR QL STRIP: NEGATIVE
BUN SERPL-MCNC: 18 MG/DL (ref 8–23)
BUN/CREAT SERPL: 18.6 (ref 7–25)
CALCIUM SPEC-SCNC: 9.7 MG/DL (ref 8.6–10.5)
CHLORIDE SERPL-SCNC: 105 MMOL/L (ref 98–107)
CLARITY UR: CLEAR
CO2 SERPL-SCNC: 26.8 MMOL/L (ref 22–29)
COLOR UR: YELLOW
CREAT SERPL-MCNC: 0.97 MG/DL (ref 0.57–1)
DEPRECATED RDW RBC AUTO: 44.4 FL (ref 37–54)
EGFRCR SERPLBLD CKD-EPI 2021: 63.8 ML/MIN/1.73
ERYTHROCYTE [DISTWIDTH] IN BLOOD BY AUTOMATED COUNT: 12.1 % (ref 12.3–15.4)
GLOBULIN UR ELPH-MCNC: 2.6 GM/DL
GLUCOSE SERPL-MCNC: 96 MG/DL (ref 65–99)
GLUCOSE UR STRIP-MCNC: NEGATIVE MG/DL
HCT VFR BLD AUTO: 40.3 % (ref 34–46.6)
HGB BLD-MCNC: 12.8 G/DL (ref 12–15.9)
HGB UR QL STRIP.AUTO: ABNORMAL
HYALINE CASTS UR QL AUTO: ABNORMAL /LPF
INR PPP: 0.95 (ref 0.9–1.1)
KETONES UR QL STRIP: NEGATIVE
LEUKOCYTE ESTERASE UR QL STRIP.AUTO: ABNORMAL
MCH RBC QN AUTO: 31.6 PG (ref 26.6–33)
MCHC RBC AUTO-ENTMCNC: 31.8 G/DL (ref 31.5–35.7)
MCV RBC AUTO: 99.5 FL (ref 79–97)
NITRITE UR QL STRIP: POSITIVE
PH UR STRIP.AUTO: 6.5 [PH] (ref 5–8)
PLATELET # BLD AUTO: 368 10*3/MM3 (ref 140–450)
PMV BLD AUTO: 8.7 FL (ref 6–12)
POTASSIUM SERPL-SCNC: 4 MMOL/L (ref 3.5–5.2)
PROT SERPL-MCNC: 7.1 G/DL (ref 6–8.5)
PROT UR QL STRIP: NEGATIVE
PROTHROMBIN TIME: 12.5 SECONDS (ref 11.7–14.2)
QT INTERVAL: 382 MS
RBC # BLD AUTO: 4.05 10*6/MM3 (ref 3.77–5.28)
RBC # UR STRIP: ABNORMAL /HPF
REF LAB TEST METHOD: ABNORMAL
SODIUM SERPL-SCNC: 141 MMOL/L (ref 136–145)
SP GR UR STRIP: 1.02 (ref 1–1.03)
SQUAMOUS #/AREA URNS HPF: ABNORMAL /HPF
UROBILINOGEN UR QL STRIP: ABNORMAL
WBC # UR STRIP: ABNORMAL /HPF
WBC NRBC COR # BLD: 7.6 10*3/MM3 (ref 3.4–10.8)

## 2022-03-31 PROCEDURE — 93005 ELECTROCARDIOGRAM TRACING: CPT

## 2022-03-31 PROCEDURE — 87186 SC STD MICRODIL/AGAR DIL: CPT

## 2022-03-31 PROCEDURE — 71046 X-RAY EXAM CHEST 2 VIEWS: CPT

## 2022-03-31 PROCEDURE — 81001 URINALYSIS AUTO W/SCOPE: CPT

## 2022-03-31 PROCEDURE — 93010 ELECTROCARDIOGRAM REPORT: CPT | Performed by: INTERNAL MEDICINE

## 2022-03-31 PROCEDURE — 85610 PROTHROMBIN TIME: CPT

## 2022-03-31 PROCEDURE — 80053 COMPREHEN METABOLIC PANEL: CPT

## 2022-03-31 PROCEDURE — 85027 COMPLETE CBC AUTOMATED: CPT

## 2022-03-31 PROCEDURE — 87086 URINE CULTURE/COLONY COUNT: CPT

## 2022-03-31 PROCEDURE — 36415 COLL VENOUS BLD VENIPUNCTURE: CPT

## 2022-03-31 PROCEDURE — 87088 URINE BACTERIA CULTURE: CPT

## 2022-03-31 PROCEDURE — 73502 X-RAY EXAM HIP UNI 2-3 VIEWS: CPT

## 2022-03-31 RX ORDER — CHOLECALCIFEROL (VITAMIN D3) 125 MCG
5 CAPSULE ORAL NIGHTLY
COMMUNITY

## 2022-03-31 RX ORDER — ALBUTEROL SULFATE 90 UG/1
2 AEROSOL, METERED RESPIRATORY (INHALATION) EVERY 4 HOURS PRN
COMMUNITY

## 2022-03-31 RX ORDER — CETIRIZINE HYDROCHLORIDE 10 MG/1
10 TABLET ORAL DAILY
COMMUNITY
End: 2023-03-10 | Stop reason: ALTCHOICE

## 2022-03-31 ASSESSMENT — HOOS JR
HOOS JR SCORE: 10
HOOS JR SCORE: 58.93

## 2022-04-02 LAB — BACTERIA SPEC AEROBE CULT: ABNORMAL

## 2022-04-12 ENCOUNTER — LAB (OUTPATIENT)
Dept: LAB | Facility: HOSPITAL | Age: 67
End: 2022-04-12

## 2022-04-12 LAB — SARS-COV-2 ORF1AB RESP QL NAA+PROBE: NOT DETECTED

## 2022-04-12 PROCEDURE — U0004 COV-19 TEST NON-CDC HGH THRU: HCPCS

## 2022-04-12 PROCEDURE — C9803 HOPD COVID-19 SPEC COLLECT: HCPCS

## 2022-04-13 ENCOUNTER — ANESTHESIA EVENT (OUTPATIENT)
Dept: PERIOP | Facility: HOSPITAL | Age: 67
End: 2022-04-13

## 2022-04-14 ENCOUNTER — APPOINTMENT (OUTPATIENT)
Dept: GENERAL RADIOLOGY | Facility: HOSPITAL | Age: 67
End: 2022-04-14

## 2022-04-14 ENCOUNTER — HOSPITAL ENCOUNTER (OUTPATIENT)
Facility: HOSPITAL | Age: 67
Discharge: HOME | End: 2022-04-15
Attending: ORTHOPAEDIC SURGERY | Admitting: ORTHOPAEDIC SURGERY

## 2022-04-14 ENCOUNTER — ANESTHESIA (OUTPATIENT)
Dept: PERIOP | Facility: HOSPITAL | Age: 67
End: 2022-04-14

## 2022-04-14 DIAGNOSIS — M16.11 PRIMARY OSTEOARTHRITIS OF RIGHT HIP: Primary | ICD-10-CM

## 2022-04-14 PROCEDURE — 25010000002 EPINEPHRINE 1 MG/ML SOLUTION 30 ML VIAL: Performed by: ORTHOPAEDIC SURGERY

## 2022-04-14 PROCEDURE — C1776 JOINT DEVICE (IMPLANTABLE): HCPCS | Performed by: ORTHOPAEDIC SURGERY

## 2022-04-14 PROCEDURE — 25010000002 CEFAZOLIN IN DEXTROSE 2-4 GM/100ML-% SOLUTION: Performed by: ORTHOPAEDIC SURGERY

## 2022-04-14 PROCEDURE — G0378 HOSPITAL OBSERVATION PER HR: HCPCS

## 2022-04-14 PROCEDURE — 25010000002 FENTANYL CITRATE (PF) 50 MCG/ML SOLUTION: Performed by: ANESTHESIOLOGY

## 2022-04-14 PROCEDURE — 76942 ECHO GUIDE FOR BIOPSY: CPT | Performed by: ORTHOPAEDIC SURGERY

## 2022-04-14 PROCEDURE — 0 MEPIVACAINE HCL (PF) 1.5 % SOLUTION: Performed by: ANESTHESIOLOGY

## 2022-04-14 PROCEDURE — 73501 X-RAY EXAM HIP UNI 1 VIEW: CPT

## 2022-04-14 PROCEDURE — 25010000002 PROPOFOL 10 MG/ML EMULSION

## 2022-04-14 PROCEDURE — 25010000002 NEOSTIGMINE 5 MG/10ML SOLUTION

## 2022-04-14 PROCEDURE — 25010000002 FENTANYL CITRATE (PF) 50 MCG/ML SOLUTION

## 2022-04-14 PROCEDURE — 25010000002 PHENYLEPHRINE 10 MG/ML SOLUTION

## 2022-04-14 PROCEDURE — 97161 PT EVAL LOW COMPLEX 20 MIN: CPT

## 2022-04-14 PROCEDURE — 25010000002 KETOROLAC TROMETHAMINE PER 15 MG: Performed by: ORTHOPAEDIC SURGERY

## 2022-04-14 PROCEDURE — C1713 ANCHOR/SCREW BN/BN,TIS/BN: HCPCS | Performed by: ORTHOPAEDIC SURGERY

## 2022-04-14 PROCEDURE — 25010000002 DIPHENHYDRAMINE PER 50 MG

## 2022-04-14 PROCEDURE — 63710000001 PROMETHAZINE PER 12.5 MG: Performed by: ORTHOPAEDIC SURGERY

## 2022-04-14 PROCEDURE — 25010000002 ROPIVACAINE PER 1 MG: Performed by: ORTHOPAEDIC SURGERY

## 2022-04-14 PROCEDURE — 25010000002 ONDANSETRON PER 1 MG

## 2022-04-14 PROCEDURE — 25010000002 ROPIVACAINE PER 1 MG: Performed by: ANESTHESIOLOGY

## 2022-04-14 PROCEDURE — 97110 THERAPEUTIC EXERCISES: CPT

## 2022-04-14 PROCEDURE — 25010000002 DEXAMETHASONE PER 1 MG

## 2022-04-14 PROCEDURE — 25010000002 MIDAZOLAM PER 1 MG: Performed by: ANESTHESIOLOGY

## 2022-04-14 PROCEDURE — 25010000002 MORPHINE PER 10 MG: Performed by: ORTHOPAEDIC SURGERY

## 2022-04-14 PROCEDURE — 25010000002 CEFAZOLIN IN DEXTROSE 2-4 GM/100ML-% SOLUTION

## 2022-04-14 DEVICE — IMPLANTABLE DEVICE
Type: IMPLANTABLE DEVICE | Site: HIP | Status: FUNCTIONAL
Brand: G7® DUAL MOBILITY ACETABULAR SYSTEM

## 2022-04-14 DEVICE — CAP HIP 2 MOBL UPCHRG: Type: IMPLANTABLE DEVICE | Site: HIP | Status: FUNCTIONAL

## 2022-04-14 DEVICE — STEM FEM/HIP TAPERLOC COMPL DIST/REDUC PPS OFFST/STD SZ12: Type: IMPLANTABLE DEVICE | Site: HIP | Status: FUNCTIONAL

## 2022-04-14 DEVICE — TOTAL HIP PRIMARY: Type: IMPLANTABLE DEVICE | Site: HIP | Status: FUNCTIONAL

## 2022-04-14 DEVICE — SCRW ACET CORT TRILOGY S/TAP 6.5X35: Type: IMPLANTABLE DEVICE | Site: HIP | Status: FUNCTIONAL

## 2022-04-14 DEVICE — BONE WAX
Type: IMPLANTABLE DEVICE | Site: HIP | Status: FUNCTIONAL
Brand: ETHICON

## 2022-04-14 DEVICE — IMPLANTABLE DEVICE
Type: IMPLANTABLE DEVICE | Site: HIP | Status: FUNCTIONAL
Brand: BIOLOX® OPTION

## 2022-04-14 DEVICE — IMPLANTABLE DEVICE: Type: IMPLANTABLE DEVICE | Site: HIP | Status: FUNCTIONAL

## 2022-04-14 DEVICE — CP HIP UPCHRG OSSEOTI LTD HL CUPS: Type: IMPLANTABLE DEVICE | Site: HIP | Status: FUNCTIONAL

## 2022-04-14 DEVICE — IMPLANTABLE DEVICE
Type: IMPLANTABLE DEVICE | Site: HIP | Status: FUNCTIONAL
Brand: BIOLOX® DELTA OPTION

## 2022-04-14 DEVICE — SHLL ACET OSSEOTI G7 3H SZE 52MM: Type: IMPLANTABLE DEVICE | Site: HIP | Status: FUNCTIONAL

## 2022-04-14 DEVICE — IMPLANTABLE DEVICE
Type: IMPLANTABLE DEVICE | Site: HIP | Status: FUNCTIONAL
Brand: VIVACIT-E®

## 2022-04-14 RX ORDER — MAGNESIUM HYDROXIDE 1200 MG/15ML
LIQUID ORAL AS NEEDED
Status: DISCONTINUED | OUTPATIENT
Start: 2022-04-14 | End: 2022-04-14 | Stop reason: HOSPADM

## 2022-04-14 RX ORDER — CHOLECALCIFEROL (VITAMIN D3) 125 MCG
5 CAPSULE ORAL NIGHTLY PRN
Status: DISCONTINUED | OUTPATIENT
Start: 2022-04-14 | End: 2022-04-15 | Stop reason: HOSPADM

## 2022-04-14 RX ORDER — SODIUM CHLORIDE 0.9 % (FLUSH) 0.9 %
3-10 SYRINGE (ML) INJECTION AS NEEDED
Status: DISCONTINUED | OUTPATIENT
Start: 2022-04-14 | End: 2022-04-14 | Stop reason: HOSPADM

## 2022-04-14 RX ORDER — DIAZEPAM 5 MG/1
5 TABLET ORAL 2 TIMES DAILY PRN
Status: DISCONTINUED | OUTPATIENT
Start: 2022-04-14 | End: 2022-04-15 | Stop reason: HOSPADM

## 2022-04-14 RX ORDER — NALOXONE HCL 0.4 MG/ML
0.2 VIAL (ML) INJECTION AS NEEDED
Status: DISCONTINUED | OUTPATIENT
Start: 2022-04-14 | End: 2022-04-14 | Stop reason: HOSPADM

## 2022-04-14 RX ORDER — FENTANYL CITRATE 50 UG/ML
INJECTION, SOLUTION INTRAMUSCULAR; INTRAVENOUS AS NEEDED
Status: DISCONTINUED | OUTPATIENT
Start: 2022-04-14 | End: 2022-04-14 | Stop reason: SURG

## 2022-04-14 RX ORDER — DOCUSATE SODIUM 100 MG/1
100 CAPSULE, LIQUID FILLED ORAL 2 TIMES DAILY PRN
Status: DISCONTINUED | OUTPATIENT
Start: 2022-04-14 | End: 2022-04-15 | Stop reason: HOSPADM

## 2022-04-14 RX ORDER — PROMETHAZINE HYDROCHLORIDE 25 MG/1
25 TABLET ORAL ONCE AS NEEDED
Status: DISCONTINUED | OUTPATIENT
Start: 2022-04-14 | End: 2022-04-14 | Stop reason: HOSPADM

## 2022-04-14 RX ORDER — DEXAMETHASONE SODIUM PHOSPHATE 10 MG/ML
INJECTION INTRAMUSCULAR; INTRAVENOUS AS NEEDED
Status: DISCONTINUED | OUTPATIENT
Start: 2022-04-14 | End: 2022-04-14 | Stop reason: SURG

## 2022-04-14 RX ORDER — PROMETHAZINE HYDROCHLORIDE 12.5 MG/1
12.5 TABLET ORAL EVERY 6 HOURS PRN
Status: DISCONTINUED | OUTPATIENT
Start: 2022-04-14 | End: 2022-04-15 | Stop reason: HOSPADM

## 2022-04-14 RX ORDER — HYDRALAZINE HYDROCHLORIDE 20 MG/ML
5 INJECTION INTRAMUSCULAR; INTRAVENOUS
Status: DISCONTINUED | OUTPATIENT
Start: 2022-04-14 | End: 2022-04-14 | Stop reason: HOSPADM

## 2022-04-14 RX ORDER — OXYCODONE HYDROCHLORIDE AND ACETAMINOPHEN 5; 325 MG/1; MG/1
1 TABLET ORAL EVERY 4 HOURS PRN
Status: DISCONTINUED | OUTPATIENT
Start: 2022-04-14 | End: 2022-04-15 | Stop reason: HOSPADM

## 2022-04-14 RX ORDER — OXYCODONE HYDROCHLORIDE AND ACETAMINOPHEN 5; 325 MG/1; MG/1
2 TABLET ORAL EVERY 4 HOURS PRN
Status: DISCONTINUED | OUTPATIENT
Start: 2022-04-14 | End: 2022-04-15 | Stop reason: HOSPADM

## 2022-04-14 RX ORDER — OXYCODONE AND ACETAMINOPHEN 7.5; 325 MG/1; MG/1
1 TABLET ORAL EVERY 4 HOURS PRN
Status: DISCONTINUED | OUTPATIENT
Start: 2022-04-14 | End: 2022-04-14 | Stop reason: HOSPADM

## 2022-04-14 RX ORDER — ONDANSETRON 2 MG/ML
4 INJECTION INTRAMUSCULAR; INTRAVENOUS EVERY 6 HOURS PRN
Status: DISCONTINUED | OUTPATIENT
Start: 2022-04-14 | End: 2022-04-15 | Stop reason: HOSPADM

## 2022-04-14 RX ORDER — ACETAMINOPHEN 500 MG
1000 TABLET ORAL ONCE
Status: COMPLETED | OUTPATIENT
Start: 2022-04-14 | End: 2022-04-14

## 2022-04-14 RX ORDER — OXYCODONE HYDROCHLORIDE AND ACETAMINOPHEN 5; 325 MG/1; MG/1
1-2 TABLET ORAL EVERY 4 HOURS PRN
Qty: 60 TABLET | Refills: 0 | Status: SHIPPED | OUTPATIENT
Start: 2022-04-14 | End: 2023-03-10 | Stop reason: ALTCHOICE

## 2022-04-14 RX ORDER — MONTELUKAST SODIUM 10 MG/1
10 TABLET ORAL NIGHTLY
Status: DISCONTINUED | OUTPATIENT
Start: 2022-04-14 | End: 2022-04-15 | Stop reason: HOSPADM

## 2022-04-14 RX ORDER — FERROUS SULFATE 325(65) MG
325 TABLET ORAL
Status: DISCONTINUED | OUTPATIENT
Start: 2022-04-14 | End: 2022-04-15 | Stop reason: HOSPADM

## 2022-04-14 RX ORDER — SODIUM CHLORIDE 0.9 % (FLUSH) 0.9 %
10 SYRINGE (ML) INJECTION EVERY 12 HOURS SCHEDULED
Status: DISCONTINUED | OUTPATIENT
Start: 2022-04-14 | End: 2022-04-15 | Stop reason: HOSPADM

## 2022-04-14 RX ORDER — HYDROCODONE BITARTRATE AND ACETAMINOPHEN 7.5; 325 MG/1; MG/1
1 TABLET ORAL ONCE AS NEEDED
Status: COMPLETED | OUTPATIENT
Start: 2022-04-14 | End: 2022-04-14

## 2022-04-14 RX ORDER — FENTANYL CITRATE 50 UG/ML
50 INJECTION, SOLUTION INTRAMUSCULAR; INTRAVENOUS
Status: DISCONTINUED | OUTPATIENT
Start: 2022-04-14 | End: 2022-04-14 | Stop reason: HOSPADM

## 2022-04-14 RX ORDER — SCOLOPAMINE TRANSDERMAL SYSTEM 1 MG/1
1 PATCH, EXTENDED RELEASE TRANSDERMAL ONCE
Status: DISCONTINUED | OUTPATIENT
Start: 2022-04-14 | End: 2022-04-14

## 2022-04-14 RX ORDER — MIDAZOLAM HYDROCHLORIDE 1 MG/ML
0.5 INJECTION INTRAMUSCULAR; INTRAVENOUS
Status: DISCONTINUED | OUTPATIENT
Start: 2022-04-14 | End: 2022-04-14 | Stop reason: HOSPADM

## 2022-04-14 RX ORDER — NEOSTIGMINE METHYLSULFATE 0.5 MG/ML
INJECTION, SOLUTION INTRAVENOUS AS NEEDED
Status: DISCONTINUED | OUTPATIENT
Start: 2022-04-14 | End: 2022-04-14 | Stop reason: SURG

## 2022-04-14 RX ORDER — CEFAZOLIN SODIUM 2 G/100ML
2 INJECTION, SOLUTION INTRAVENOUS ONCE
Status: COMPLETED | OUTPATIENT
Start: 2022-04-14 | End: 2022-04-14

## 2022-04-14 RX ORDER — CLINDAMYCIN PHOSPHATE 900 MG/50ML
900 INJECTION INTRAVENOUS EVERY 8 HOURS
Status: COMPLETED | OUTPATIENT
Start: 2022-04-14 | End: 2022-04-15

## 2022-04-14 RX ORDER — SODIUM CHLORIDE, SODIUM LACTATE, POTASSIUM CHLORIDE, CALCIUM CHLORIDE 600; 310; 30; 20 MG/100ML; MG/100ML; MG/100ML; MG/100ML
9 INJECTION, SOLUTION INTRAVENOUS CONTINUOUS
Status: DISCONTINUED | OUTPATIENT
Start: 2022-04-14 | End: 2022-04-15 | Stop reason: HOSPADM

## 2022-04-14 RX ORDER — ASPIRIN 325 MG
325 TABLET, DELAYED RELEASE (ENTERIC COATED) ORAL 2 TIMES DAILY WITH MEALS
Qty: 60 TABLET | Refills: 0 | Status: SHIPPED | OUTPATIENT
Start: 2022-04-15 | End: 2023-03-10 | Stop reason: ALTCHOICE

## 2022-04-14 RX ORDER — FLUTICASONE PROPIONATE 50 MCG
1 SPRAY, SUSPENSION (ML) NASAL DAILY
Status: DISCONTINUED | OUTPATIENT
Start: 2022-04-14 | End: 2022-04-15 | Stop reason: HOSPADM

## 2022-04-14 RX ORDER — PROPOFOL 10 MG/ML
VIAL (ML) INTRAVENOUS AS NEEDED
Status: DISCONTINUED | OUTPATIENT
Start: 2022-04-14 | End: 2022-04-14 | Stop reason: SURG

## 2022-04-14 RX ORDER — AMITRIPTYLINE HYDROCHLORIDE 25 MG/1
25 TABLET, FILM COATED ORAL NIGHTLY
Status: DISCONTINUED | OUTPATIENT
Start: 2022-04-14 | End: 2022-04-15 | Stop reason: HOSPADM

## 2022-04-14 RX ORDER — LIDOCAINE HYDROCHLORIDE 20 MG/ML
INJECTION, SOLUTION INFILTRATION; PERINEURAL AS NEEDED
Status: DISCONTINUED | OUTPATIENT
Start: 2022-04-14 | End: 2022-04-14 | Stop reason: SURG

## 2022-04-14 RX ORDER — MIDAZOLAM HYDROCHLORIDE 1 MG/ML
INJECTION INTRAMUSCULAR; INTRAVENOUS
Status: COMPLETED | OUTPATIENT
Start: 2022-04-14 | End: 2022-04-14

## 2022-04-14 RX ORDER — FLUMAZENIL 0.1 MG/ML
0.2 INJECTION INTRAVENOUS AS NEEDED
Status: DISCONTINUED | OUTPATIENT
Start: 2022-04-14 | End: 2022-04-14 | Stop reason: HOSPADM

## 2022-04-14 RX ORDER — ONDANSETRON 2 MG/ML
INJECTION INTRAMUSCULAR; INTRAVENOUS AS NEEDED
Status: DISCONTINUED | OUTPATIENT
Start: 2022-04-14 | End: 2022-04-14 | Stop reason: SURG

## 2022-04-14 RX ORDER — DIPHENHYDRAMINE HYDROCHLORIDE 50 MG/ML
INJECTION INTRAMUSCULAR; INTRAVENOUS AS NEEDED
Status: DISCONTINUED | OUTPATIENT
Start: 2022-04-14 | End: 2022-04-14 | Stop reason: SURG

## 2022-04-14 RX ORDER — EPHEDRINE SULFATE 50 MG/ML
INJECTION, SOLUTION INTRAVENOUS AS NEEDED
Status: DISCONTINUED | OUTPATIENT
Start: 2022-04-14 | End: 2022-04-14 | Stop reason: SURG

## 2022-04-14 RX ORDER — DOCUSATE SODIUM 250 MG
250 CAPSULE ORAL 2 TIMES DAILY PRN
Qty: 30 CAPSULE | Refills: 1 | Status: SHIPPED | OUTPATIENT
Start: 2022-04-14

## 2022-04-14 RX ORDER — ONDANSETRON 4 MG/1
4 TABLET, FILM COATED ORAL EVERY 6 HOURS PRN
Status: DISCONTINUED | OUTPATIENT
Start: 2022-04-14 | End: 2022-04-15 | Stop reason: HOSPADM

## 2022-04-14 RX ORDER — IBUPROFEN 600 MG/1
600 TABLET ORAL ONCE AS NEEDED
Status: DISCONTINUED | OUTPATIENT
Start: 2022-04-14 | End: 2022-04-14 | Stop reason: HOSPADM

## 2022-04-14 RX ORDER — ONDANSETRON 2 MG/ML
4 INJECTION INTRAMUSCULAR; INTRAVENOUS ONCE AS NEEDED
Status: DISCONTINUED | OUTPATIENT
Start: 2022-04-14 | End: 2022-04-14 | Stop reason: HOSPADM

## 2022-04-14 RX ORDER — ACETAMINOPHEN 325 MG/1
325 TABLET ORAL EVERY 4 HOURS PRN
Status: DISCONTINUED | OUTPATIENT
Start: 2022-04-14 | End: 2022-04-15 | Stop reason: HOSPADM

## 2022-04-14 RX ORDER — CYCLOBENZAPRINE HCL 10 MG
10 TABLET ORAL 3 TIMES DAILY PRN
Status: DISCONTINUED | OUTPATIENT
Start: 2022-04-14 | End: 2022-04-15 | Stop reason: HOSPADM

## 2022-04-14 RX ORDER — LABETALOL HYDROCHLORIDE 5 MG/ML
5 INJECTION, SOLUTION INTRAVENOUS
Status: DISCONTINUED | OUTPATIENT
Start: 2022-04-14 | End: 2022-04-14 | Stop reason: HOSPADM

## 2022-04-14 RX ORDER — DIPHENHYDRAMINE HYDROCHLORIDE 50 MG/ML
12.5 INJECTION INTRAMUSCULAR; INTRAVENOUS
Status: DISCONTINUED | OUTPATIENT
Start: 2022-04-14 | End: 2022-04-14 | Stop reason: HOSPADM

## 2022-04-14 RX ORDER — NALOXONE HCL 0.4 MG/ML
0.4 VIAL (ML) INJECTION
Status: DISCONTINUED | OUTPATIENT
Start: 2022-04-14 | End: 2022-04-15 | Stop reason: HOSPADM

## 2022-04-14 RX ORDER — SODIUM CHLORIDE 450 MG/100ML
100 INJECTION, SOLUTION INTRAVENOUS CONTINUOUS
Status: DISCONTINUED | OUTPATIENT
Start: 2022-04-14 | End: 2022-04-15 | Stop reason: HOSPADM

## 2022-04-14 RX ORDER — ROPIVACAINE HYDROCHLORIDE 5 MG/ML
INJECTION, SOLUTION EPIDURAL; INFILTRATION; PERINEURAL
Status: COMPLETED | OUTPATIENT
Start: 2022-04-14 | End: 2022-04-14

## 2022-04-14 RX ORDER — FENTANYL CITRATE 50 UG/ML
INJECTION, SOLUTION INTRAMUSCULAR; INTRAVENOUS
Status: COMPLETED | OUTPATIENT
Start: 2022-04-14 | End: 2022-04-14

## 2022-04-14 RX ORDER — CEFAZOLIN SODIUM 2 G/100ML
INJECTION, SOLUTION INTRAVENOUS AS NEEDED
Status: DISCONTINUED | OUTPATIENT
Start: 2022-04-14 | End: 2022-04-14 | Stop reason: SURG

## 2022-04-14 RX ORDER — FAMOTIDINE 10 MG/ML
20 INJECTION, SOLUTION INTRAVENOUS ONCE
Status: COMPLETED | OUTPATIENT
Start: 2022-04-14 | End: 2022-04-14

## 2022-04-14 RX ORDER — EPHEDRINE SULFATE 50 MG/ML
5 INJECTION, SOLUTION INTRAVENOUS ONCE AS NEEDED
Status: DISCONTINUED | OUTPATIENT
Start: 2022-04-14 | End: 2022-04-14 | Stop reason: HOSPADM

## 2022-04-14 RX ORDER — ASPIRIN 325 MG
325 TABLET, DELAYED RELEASE (ENTERIC COATED) ORAL 2 TIMES DAILY WITH MEALS
Status: DISCONTINUED | OUTPATIENT
Start: 2022-04-15 | End: 2022-04-15 | Stop reason: HOSPADM

## 2022-04-14 RX ORDER — PROMETHAZINE HYDROCHLORIDE 25 MG/1
25 SUPPOSITORY RECTAL ONCE AS NEEDED
Status: DISCONTINUED | OUTPATIENT
Start: 2022-04-14 | End: 2022-04-14 | Stop reason: HOSPADM

## 2022-04-14 RX ORDER — HYDROMORPHONE HYDROCHLORIDE 1 MG/ML
0.5 INJECTION, SOLUTION INTRAMUSCULAR; INTRAVENOUS; SUBCUTANEOUS
Status: DISCONTINUED | OUTPATIENT
Start: 2022-04-14 | End: 2022-04-14 | Stop reason: HOSPADM

## 2022-04-14 RX ORDER — DIPHENHYDRAMINE HCL 25 MG
25 CAPSULE ORAL
Status: DISCONTINUED | OUTPATIENT
Start: 2022-04-14 | End: 2022-04-14 | Stop reason: HOSPADM

## 2022-04-14 RX ORDER — CETIRIZINE HYDROCHLORIDE 10 MG/1
10 TABLET ORAL DAILY
Status: DISCONTINUED | OUTPATIENT
Start: 2022-04-14 | End: 2022-04-15 | Stop reason: HOSPADM

## 2022-04-14 RX ORDER — TRAMADOL HYDROCHLORIDE 50 MG/1
50 TABLET ORAL EVERY 4 HOURS PRN
Status: DISCONTINUED | OUTPATIENT
Start: 2022-04-14 | End: 2022-04-15 | Stop reason: HOSPADM

## 2022-04-14 RX ORDER — LIDOCAINE HYDROCHLORIDE 10 MG/ML
0.5 INJECTION, SOLUTION EPIDURAL; INFILTRATION; INTRACAUDAL; PERINEURAL ONCE AS NEEDED
Status: DISCONTINUED | OUTPATIENT
Start: 2022-04-14 | End: 2022-04-14 | Stop reason: HOSPADM

## 2022-04-14 RX ORDER — SODIUM CHLORIDE 0.9 % (FLUSH) 0.9 %
3 SYRINGE (ML) INJECTION EVERY 12 HOURS SCHEDULED
Status: DISCONTINUED | OUTPATIENT
Start: 2022-04-14 | End: 2022-04-14 | Stop reason: HOSPADM

## 2022-04-14 RX ORDER — GLYCOPYRROLATE 0.2 MG/ML
INJECTION INTRAMUSCULAR; INTRAVENOUS AS NEEDED
Status: DISCONTINUED | OUTPATIENT
Start: 2022-04-14 | End: 2022-04-14 | Stop reason: SURG

## 2022-04-14 RX ORDER — SODIUM CHLORIDE 0.9 % (FLUSH) 0.9 %
1-10 SYRINGE (ML) INJECTION AS NEEDED
Status: DISCONTINUED | OUTPATIENT
Start: 2022-04-14 | End: 2022-04-15 | Stop reason: HOSPADM

## 2022-04-14 RX ORDER — ROCURONIUM BROMIDE 10 MG/ML
INJECTION, SOLUTION INTRAVENOUS AS NEEDED
Status: DISCONTINUED | OUTPATIENT
Start: 2022-04-14 | End: 2022-04-14 | Stop reason: SURG

## 2022-04-14 RX ORDER — MORPHINE SULFATE 2 MG/ML
4 INJECTION, SOLUTION INTRAMUSCULAR; INTRAVENOUS
Status: DISCONTINUED | OUTPATIENT
Start: 2022-04-14 | End: 2022-04-15 | Stop reason: HOSPADM

## 2022-04-14 RX ORDER — CELECOXIB 200 MG/1
200 CAPSULE ORAL ONCE
Status: COMPLETED | OUTPATIENT
Start: 2022-04-14 | End: 2022-04-14

## 2022-04-14 RX ORDER — PHENYLEPHRINE HYDROCHLORIDE 10 MG/ML
INJECTION INTRAVENOUS AS NEEDED
Status: DISCONTINUED | OUTPATIENT
Start: 2022-04-14 | End: 2022-04-14 | Stop reason: SURG

## 2022-04-14 RX ORDER — ALBUTEROL SULFATE 90 UG/1
2 AEROSOL, METERED RESPIRATORY (INHALATION) EVERY 4 HOURS PRN
Status: DISCONTINUED | OUTPATIENT
Start: 2022-04-14 | End: 2022-04-15 | Stop reason: HOSPADM

## 2022-04-14 RX ADMIN — CELECOXIB 200 MG: 200 CAPSULE ORAL at 06:00

## 2022-04-14 RX ADMIN — MIDAZOLAM 2 MG: 1 INJECTION INTRAMUSCULAR; INTRAVENOUS at 06:26

## 2022-04-14 RX ADMIN — ROCURONIUM BROMIDE 10 MG: 50 INJECTION INTRAVENOUS at 07:32

## 2022-04-14 RX ADMIN — CLINDAMYCIN IN 5 PERCENT DEXTROSE 900 MG: 18 INJECTION, SOLUTION INTRAVENOUS at 16:08

## 2022-04-14 RX ADMIN — MONTELUKAST SODIUM 10 MG: 10 TABLET, FILM COATED ORAL at 20:24

## 2022-04-14 RX ADMIN — EPHEDRINE SULFATE 10 MG: 50 INJECTION INTRAVENOUS at 07:06

## 2022-04-14 RX ADMIN — ROCURONIUM BROMIDE 50 MG: 50 INJECTION INTRAVENOUS at 07:02

## 2022-04-14 RX ADMIN — FAMOTIDINE 20 MG: 10 INJECTION INTRAVENOUS at 06:23

## 2022-04-14 RX ADMIN — Medication 10 ML: at 12:15

## 2022-04-14 RX ADMIN — CLINDAMYCIN IN 5 PERCENT DEXTROSE 900 MG: 18 INJECTION, SOLUTION INTRAVENOUS at 23:00

## 2022-04-14 RX ADMIN — PHENYLEPHRINE HYDROCHLORIDE 100 MCG: 10 INJECTION, SOLUTION INTRAVENOUS at 07:53

## 2022-04-14 RX ADMIN — NEOSTIGMINE METHYLSULFATE 4 MG: 0.5 INJECTION INTRAVENOUS at 08:30

## 2022-04-14 RX ADMIN — HYDROCODONE BITARTRATE AND ACETAMINOPHEN 1 TABLET: 7.5; 325 TABLET ORAL at 09:31

## 2022-04-14 RX ADMIN — FENTANYL CITRATE 50 MCG: 0.05 INJECTION, SOLUTION INTRAMUSCULAR; INTRAVENOUS at 07:01

## 2022-04-14 RX ADMIN — FENTANYL CITRATE 50 MCG: 0.05 INJECTION, SOLUTION INTRAMUSCULAR; INTRAVENOUS at 07:20

## 2022-04-14 RX ADMIN — FENTANYL CITRATE 50 MCG: 0.05 INJECTION, SOLUTION INTRAMUSCULAR; INTRAVENOUS at 06:26

## 2022-04-14 RX ADMIN — MEPIVACAINE HYDROCHLORIDE 15 ML: 15 INJECTION, SOLUTION EPIDURAL; INFILTRATION at 06:00

## 2022-04-14 RX ADMIN — ONDANSETRON 4 MG: 2 INJECTION INTRAMUSCULAR; INTRAVENOUS at 08:20

## 2022-04-14 RX ADMIN — FENTANYL CITRATE 50 MCG: 50 INJECTION INTRAMUSCULAR; INTRAVENOUS at 09:30

## 2022-04-14 RX ADMIN — CEFAZOLIN SODIUM 2 G: 2 INJECTION, SOLUTION INTRAVENOUS at 08:16

## 2022-04-14 RX ADMIN — PROMETHAZINE HYDROCHLORIDE 12.5 MG: 12.5 TABLET ORAL at 11:36

## 2022-04-14 RX ADMIN — ACETAMINOPHEN 1000 MG: 500 TABLET ORAL at 06:00

## 2022-04-14 RX ADMIN — SODIUM CHLORIDE 100 ML/HR: 4.5 INJECTION, SOLUTION INTRAVENOUS at 11:44

## 2022-04-14 RX ADMIN — FLUTICASONE PROPIONATE 1 SPRAY: 50 SPRAY, METERED NASAL at 16:11

## 2022-04-14 RX ADMIN — CEFAZOLIN SODIUM 2 G: 2 INJECTION, SOLUTION INTRAVENOUS at 06:48

## 2022-04-14 RX ADMIN — SCOPALAMINE 1 PATCH: 1 PATCH, EXTENDED RELEASE TRANSDERMAL at 06:21

## 2022-04-14 RX ADMIN — MUPIROCIN 1 APPLICATION: 20 OINTMENT TOPICAL at 20:24

## 2022-04-14 RX ADMIN — DEXAMETHASONE SODIUM PHOSPHATE 8 MG: 10 INJECTION INTRAMUSCULAR; INTRAVENOUS at 07:06

## 2022-04-14 RX ADMIN — PHENYLEPHRINE HYDROCHLORIDE 100 MCG: 10 INJECTION, SOLUTION INTRAVENOUS at 08:16

## 2022-04-14 RX ADMIN — GLYCOPYRROLATE 0.6 MG: 0.2 INJECTION INTRAMUSCULAR; INTRAVENOUS at 08:30

## 2022-04-14 RX ADMIN — ROPIVACAINE HYDROCHLORIDE 20 ML: 5 INJECTION, SOLUTION EPIDURAL; INFILTRATION; PERINEURAL at 06:00

## 2022-04-14 RX ADMIN — EPHEDRINE SULFATE 10 MG: 50 INJECTION INTRAVENOUS at 07:19

## 2022-04-14 RX ADMIN — OXYCODONE HYDROCHLORIDE AND ACETAMINOPHEN 2 TABLET: 5; 325 TABLET ORAL at 20:25

## 2022-04-14 RX ADMIN — SODIUM CHLORIDE, POTASSIUM CHLORIDE, SODIUM LACTATE AND CALCIUM CHLORIDE: 600; 310; 30; 20 INJECTION, SOLUTION INTRAVENOUS at 08:28

## 2022-04-14 RX ADMIN — PHENYLEPHRINE HYDROCHLORIDE 200 MCG: 10 INJECTION, SOLUTION INTRAVENOUS at 08:30

## 2022-04-14 RX ADMIN — DIPHENHYDRAMINE HYDROCHLORIDE 12.5 MG: 50 INJECTION, SOLUTION INTRAMUSCULAR; INTRAVENOUS at 08:05

## 2022-04-14 RX ADMIN — AMITRIPTYLINE HYDROCHLORIDE 25 MG: 25 TABLET, FILM COATED ORAL at 20:24

## 2022-04-14 RX ADMIN — OXYCODONE HYDROCHLORIDE AND ACETAMINOPHEN 1 TABLET: 5; 325 TABLET ORAL at 16:11

## 2022-04-14 RX ADMIN — FENTANYL CITRATE 50 MCG: 50 INJECTION INTRAMUSCULAR; INTRAVENOUS at 09:17

## 2022-04-14 RX ADMIN — PHENYLEPHRINE HYDROCHLORIDE 100 MCG: 10 INJECTION, SOLUTION INTRAVENOUS at 08:24

## 2022-04-14 RX ADMIN — PHENYLEPHRINE HYDROCHLORIDE 100 MCG: 10 INJECTION, SOLUTION INTRAVENOUS at 08:06

## 2022-04-14 RX ADMIN — PHENYLEPHRINE HYDROCHLORIDE 100 MCG: 10 INJECTION, SOLUTION INTRAVENOUS at 07:58

## 2022-04-14 RX ADMIN — PROPOFOL 25 MCG/KG/MIN: 10 INJECTION, EMULSION INTRAVENOUS at 07:06

## 2022-04-14 RX ADMIN — LIDOCAINE HYDROCHLORIDE 80 MG: 20 INJECTION, SOLUTION INFILTRATION; PERINEURAL at 07:01

## 2022-04-14 RX ADMIN — CETIRIZINE HYDROCHLORIDE 10 MG: 10 TABLET ORAL at 16:11

## 2022-04-14 RX ADMIN — PROPOFOL 150 MG: 10 INJECTION, EMULSION INTRAVENOUS at 07:01

## 2022-04-14 RX ADMIN — EPHEDRINE SULFATE 10 MG: 50 INJECTION INTRAVENOUS at 07:48

## 2022-04-14 RX ADMIN — SODIUM CHLORIDE, POTASSIUM CHLORIDE, SODIUM LACTATE AND CALCIUM CHLORIDE 9 ML/HR: 600; 310; 30; 20 INJECTION, SOLUTION INTRAVENOUS at 06:02

## 2022-04-14 NOTE — ANESTHESIA POSTPROCEDURE EVALUATION
Patient: Kaci Patrick    Procedure Summary     Date: 04/14/22 Room / Location: [facility] OR [address] [place] [address] MAIN OR    Anesthesia Start: 0653 Anesthesia Stop: 0858    Procedure: RIGHT TOTAL HIP ARTHROPLASTY (Right Hip) Diagnosis:     Surgeons: Randal Covarrubias MD Provider: Milton Hui MD    Anesthesia Type: general with block ASA Status: 3          Anesthesia Type: general with block    Vitals  Vitals Value Taken Time   /78 04/14/22 0946   Temp 36.4 °C (97.5 °F) 04/14/22 0854   Pulse 83 04/14/22 1000   Resp 16 04/14/22 0945   SpO2 99 % 04/14/22 1000   Vitals shown include unvalidated device data.        Post Anesthesia Care and Evaluation    Patient location during evaluation: bedside  Patient participation: complete - patient participated  Level of consciousness: awake and alert  Pain management: adequate  Airway patency: patent  Anesthetic complications: No anesthetic complications    Cardiovascular status: acceptable  Respiratory status: acceptable  Hydration status: acceptable    Comments: /78   Pulse 80   Temp 36.4 °C (97.5 °F) (Oral)   Resp 16   LMP  (LMP Unknown)   SpO2 99%

## 2022-04-14 NOTE — THERAPY EVALUATION
Patient Name: Kaci Patrick  : 1955    MRN: [id number]                              Today's Date: 2022       Admit Date: 2022    Visit Dx: No diagnosis found.  Patient Active Problem List   Diagnosis   • Screening for colon cancer   • Family history of colon cancer in father   • Primary osteoarthritis of right hip     Past Medical History:   Diagnosis Date   • Arthritis    • Asthma due to seasonal allergies    • Cervical dysplasia    • Insomnia    • Osteopenia    • PONV (postoperative nausea and vomiting)    • Right hip pain      Past Surgical History:   Procedure Laterality Date   • APPENDECTOMY     • CERVICAL CONE BIOPSY     • CERVICAL POLYPECTOMY     • COLONOSCOPY N/A 2020    Procedure: COLONOSCOPY TO CECUM;  Surgeon: Roseanne Cavanaugh MD;  [facility] ENDOSCOPY;  Service: General;  Laterality: N/A;  PRE: SCREENING AND FAMILY H/O COLON CANCER  POST: POOR PREP   • DILATATION AND CURETTAGE     • SPINAL FUSION        General Information     Row Name 22 1525          Physical Therapy Time and Intention    Document Type evaluation  -AR     Mode of Treatment physical therapy  -AR     Row Name 22 1525          General Information    Patient Profile Reviewed yes  -AR     Prior Level of Function independent:  -AR     Existing Precautions/Restrictions fall;hip, posterior;right  -AR     Barriers to Rehab none identified  -AR     Row Name 22 1525          Living Environment    People in Home spouse  -AR     Row Name 22 1525          Home Main Entrance    Number of Stairs, Main Entrance twelve  -AR     Stair Railings, Main Entrance railings safe and in good condition  -AR     Row Name 22 1525          Cognition    Orientation Status (Cognition) oriented x 3  -AR     Row Name 22 1525          Safety Issues, Functional Mobility    Impairments Affecting Function (Mobility) balance;range of motion (ROM);strength;endurance/activity tolerance;pain  -AR            User Key  (r) = Recorded By, (t) = Taken By, (c) = Cosigned By    Initials Name Provider Type    AR Sherly Bowens, PT Physical Therapist               Mobility     Row Name 04/14/22 1526          Bed Mobility    Bed Mobility supine-sit  -AR     Supine-Sit [place] (Bed Mobility) standby assist  -AR     Assistive Device (Bed Mobility) head of bed elevated  -AR     Row Name 04/14/22 1526          Sit-Stand Transfer    Sit-Stand [place] (Transfers) contact guard  -AR     Assistive Device (Sit-Stand Transfers) walker, front-wheeled  -AR     Row Name 04/14/22 1526          Gait/Stairs (Locomotion)    [place] Level (Gait) contact guard  -AR     Assistive Device (Gait) walker, front-wheeled  -AR     Distance in Feet (Gait) 40' w/ 1 sitting break  -AR     Deviations/Abnormal Patterns (Gait) festinating/shuffling;debra decreased;antalgic  -AR     Right Sided Gait Deviations weight shift ability decreased  -AR           User Key  (r) = Recorded By, (t) = Taken By, (c) = Cosigned By    Initials Name Provider Type    AR Sherly Bowens, PT Physical Therapist               Obj/Interventions     Row Name 04/14/22 1526          Range of Motion Comprehensive    Comment, General Range of Motion WFL except R hip  -AR     Row Name 04/14/22 1526          Strength Comprehensive (MMT)    Comment, General Manual Muscle Testing (MMT) Assessment WFL except R LE  -AR     Row Name 04/14/22 1526          Motor Skills    Therapeutic Exercise --  R DINO ex program w/ ed for HEP  -AR     Row Name 04/14/22 1526          Balance    Balance Assessment standing dynamic balance  -AR     Dynamic Standing Balance minimal assist  -AR     Position/Device Used, Standing Balance walker, front-wheeled  -AR           User Key  (r) = Recorded By, (t) = Taken By, (c) = Cosigned By    Initials Name Provider Type    Sherly Myers, PT Physical Therapist               Goals/Plan     Row Name 04/14/22 1528          Transfer Goal 1 (PT)     Activity/Assistive Device (Transfer Goal 1, PT) sit-to-stand/stand-to-sit;bed-to-chair/chair-to-bed;walker, rolling  -AR     [place] Level/Cues Needed (Transfer Goal 1, PT) standby assist  -AR     Time Frame (Transfer Goal 1, PT) 1 week  -AR     Row Name 04/14/22 1528          Gait Training Goal 1 (PT)    Activity/Assistive Device (Gait Training Goal 1, PT) gait (walking locomotion);walker, rolling  -AR     [place] Level (Gait Training Goal 1, PT) contact guard required  -AR     Distance (Gait Training Goal 1, PT) 75  -AR     Time Frame (Gait Training Goal 1, PT) 1 week  -AR     Row Name 04/14/22 1528          Stairs Goal 1 (PT)    Activity/Assistive Device (Stairs Goal 1, PT) stairs, all skills;walker, rolling  -AR     [place] Level/Cues Needed (Stairs Goal 1, PT) contact guard required  -AR     Number of Stairs (Stairs Goal 1, PT) 8  -AR     Time Frame (Stairs Goal 1, PT) 1 week  -AR     Row Name 04/14/22 1528          Therapy Assessment/Plan (PT)    Planned Therapy Interventions (PT) balance training;bed mobility training;gait training;home exercise program;patient/family education;transfer training;ROM (range of motion);stair training;strengthening  -AR           User Key  (r) = Recorded By, (t) = Taken By, (c) = Cosigned By    Initials Name Provider Type    AR Sherly Bowens, PT Physical Therapist               Clinical Impression     Row Name 04/14/22 1526          Pain    Pretreatment Pain Rating 2/10  -AR     Posttreatment Pain Rating 5/10  -AR     Pain Location - Side/Orientation Right  -AR     Pain Location - hip  -AR     Pain Intervention(s) Medication (See MAR);Repositioned;Cold applied  -AR     Row Name 04/14/22 1526          Plan of Care Review    Plan of Care Reviewed With patient  -AR     Outcome Evaluation POD 0 R posterior DINO.  Pt reports normally independent.  Today pt demonstates impaired R hip ROM, strength and gait pattern from baseline but able to ambulate 40' w/ contact  assist using RW.  Performed ex w/ ed for HEP.  Ordered RW.  Educatedon h ip precautions.  Pt reports plan for DC to home with OP PT, ordered RW.  -AR     Row Name 04/14/22 1526          Therapy Assessment/Plan (PT)    Rehab Potential (PT) good, to achieve stated therapy goals  -AR     Criteria for Skilled Interventions Met (PT) yes  -AR     Therapy Frequency (PT) daily  -AR     Row Name 04/14/22 1526          Vital Signs    O2 Delivery Pre Treatment room air  -AR     Row Name 04/14/22 1526          Positioning and Restraints    Pre-Treatment Position in bed  -AR     Post Treatment Position chair  -AR     In Chair notified nsg;reclined;sitting;call light within reach;encouraged to call for assist;exit alarm on;with family/caregiver  -AR           User Key  (r) = Recorded By, (t) = Taken By, (c) = Cosigned By    Initials Name Provider Type    AR Sherly Bowens, PT Physical Therapist               Outcome Measures     Row Name 04/14/22 1528          How much help from another person do you currently need...    Turning from your back to your side while in flat bed without using bedrails? 4  -AR     Moving from lying on back to sitting on the side of a flat bed without bedrails? 3  -AR     Moving to and from a bed to a chair (including a wheelchair)? 3  -AR     Standing up from a chair using your arms (e.g., wheelchair, bedside chair)? 3  -AR     Climbing 3-5 steps with a railing? 2  -AR     To walk in hospital room? 3  -AR     AM-PAC 6 Clicks Score (PT) 18  -AR     Row Name 04/14/22 1528          Functional Assessment    Outcome Measure Options AM-PAC 6 Clicks Basic Mobility (PT)  -AR           User Key  (r) = Recorded By, (t) = Taken By, (c) = Cosigned By    Initials Name Provider Type    Sherly Myers, PT Physical Therapist                             Physical Therapy Education                 Title: PT OT SLP Therapies (Done)     Topic: Physical Therapy (Done)     Point: Mobility training (Done)     Learning  Progress Summary           Patient Acceptance, E,D,H, VU by AR at 4/14/2022 1529   Family Acceptance, E,D,H, VU by AR at 4/14/2022 1529                   Point: Home exercise program (Done)     Learning Progress Summary           Patient Acceptance, E,D,H, VU by AR at 4/14/2022 1529   Family Acceptance, E,D,H, VU by AR at 4/14/2022 1529                   Point: Body mechanics (Done)     Learning Progress Summary           Patient Acceptance, E,D,H, VU by AR at 4/14/2022 1529   Family Acceptance, E,D,H, VU by AR at 4/14/2022 1529                   Point: Precautions (Done)     Learning Progress Summary           Patient Acceptance, E,D,H, VU by AR at 4/14/2022 1529   Family Acceptance, E,D,H, VU by AR at 4/14/2022 1529                               User Key     Initials Effective Dates Name Provider Type Discipline    AR 06/16/21 -  Sherly Bowens, PT Physical Therapist PT              PT Recommendation and Plan  Planned Therapy Interventions (PT): balance training, bed mobility training, gait training, home exercise program, patient/family education, transfer training, ROM (range of motion), stair training, strengthening  Plan of Care Reviewed With: patient  Outcome Evaluation: POD 0 R posterior DINO.  Pt reports normally independent.  Today pt demonstates impaired R hip ROM, strength and gait pattern from baseline but able to ambulate 40' w/ contact assist using RW.  Performed ex w/ ed for HEP.  Ordered RW.  Educatedon h ip precautions.  Pt reports plan for DC to home with OP PT, ordered RW.     Time Calculation:    PT Charges     Row Name 04/14/22 1524             Time Calculation    Start Time 1447  -AR      Stop Time 1525  -AR      Time Calculation (min) 38 min  -AR      PT Received On 04/14/22  -AR      PT - Next Appointment 04/15/22  -AR      PT Goal Re-Cert Due Date 04/21/22  -AR            User Key  (r) = Recorded By, (t) = Taken By, (c) = Cosigned By    Initials Name Provider Type    AR Sherly Bowens,  PT Physical Therapist              Therapy Charges for Today     Code Description Service Date Service Provider Modifiers Qty    56862510956 HC PT EVAL LOW COMPLEXITY 4 4/14/2022 Sherly Bowens, PT GP 1    69650804554 HC PT THER PROC EA 15 MIN 4/14/2022 Sherly Bowens, PT GP 1          PT G-Codes  Outcome Measure Options: AM-PAC 6 Clicks Basic Mobility (PT)  AM-PAC 6 Clicks Score (PT): 18    Sherly Bowens PT  4/14/2022

## 2022-04-14 NOTE — PLAN OF CARE
Goal Outcome Evaluation:  Plan of Care Reviewed With: patient           Outcome Evaluation: POD 0 R posterior DINO.  Pt reports normally independent.  Today pt demonstates impaired R hip ROM, strength and gait pattern from baseline but able to ambulate 40' w/ contact assist using RW.  Performed ex w/ ed for HEP.  Ordered RW.  Educatedon h ip precautions.  Pt reports plan for DC to home with OP PT, ordered RW.

## 2022-04-14 NOTE — CASE MANAGEMENT/SOCIAL WORK
Discharge Planning Assessment  [facility]     Patient Name: Kaci Patrick  MRN: [id number]  Today's Date: 4/14/2022    Admit Date: 4/14/2022     Discharge Needs Assessment     Row Name 04/14/22 1513       Living Environment    People in Home spouse    Name(s) of People in Home /El.    Current Living Arrangements home    Primary Care Provided by self    Provides Primary Care For no one    Family Caregiver if Needed spouse    Quality of Family Relationships helpful;involved;supportive    Able to Return to Prior Arrangements yes       Resource/Environmental Concerns    Transportation Concerns none       Transition Planning    Patient/Family Anticipates Transition to home with family    Patient/Family Anticipated Services at Transition     Transportation Anticipated family or friend will provide       Discharge Needs Assessment    Readmission Within the Last 30 Days no previous admission in last 30 days    Equipment Currently Used at Home cane, straight    Discharge Facility/Level of Care Needs outpatient therapy    Provided Post Acute Provider List? N/A    N/A Provider List Comment LOC CP: OP PT.               Discharge Plan     Row Name 04/14/22 1513       Plan    Plan Home with family support & OP PT.    Patient/Family in Agreement with Plan yes    Plan Comments Spoke with the patient, verified current information and explained the role of the CCP. Patient said she lives with her /El and has family support. She's IADL and uses a cane as needed. She has no history with RH/HH. Patient plans to d/c home with family support & Outpatient Physical Therapy. Careplan received from LOC which plans for the pt to d/c home with OP PT. Patient said her family will transport her home at d/c. No other needs identified. CCP will follow.              Continued Care and Services - Admitted Since 4/14/2022    Coordination has not been started for this encounter.          Demographic Summary     Row  Name 04/14/22 1512       General Information    Admission Type observation    Reason for Consult discharge planning    Preferred Language English       Contact Information    Permission Granted to Share Info With ;family/designee               Functional Status     Row Name 04/14/22 1513       Functional Status    Usual Activity Tolerance good       Functional Status, IADL    Medications independent    Meal Preparation independent    Housekeeping independent    Laundry independent    Shopping independent       Mental Status    General Appearance WDL WDL       Mental Status Summary    Recent Changes in Mental Status/Cognitive Functioning no changes               Psychosocial     Row Name 04/14/22 1513       Intellectual Performance WDL    Level of Consciousness Alert       Coping/Stress    Patient Personal Strengths able to adapt    Reaction to Health Status accepting    Understanding of Condition and Treatment adequate understanding of medical condition;adequate understanding of treatment       Developmental Stage (Eriksson's)    Developmental Stage Stage 8 (63 years-death/Late Adulthood) Integrity vs. Despair                Alissa JACOME RN

## 2022-04-14 NOTE — H&P
Orthopaedic Surgery History and Physical    Patient Name:  Kaci Patrick  YOB: 1955  Age: 66 y.o.  Medical Records Number:  [id number]    Date of Admission:  2022  5:24 AM    Chief Complaint:  Primary osteoarthritis of right hip [M16.11]    Kaci Patrick is a 66 y.o. female who presents c/o severe right hip pain.  The pain has been on and off for many years, worsening recently to the point where the pain is becoming disabling. The pain is a constant dull ache with occasional sharp, stabbing pains.  The patient has failed conservative treatment and would like to proceed with right total hip arthroplasty.    /68   Pulse 79   Temp 97.9 °F (36.6 °C) (Oral)   Resp 18   LMP  (LMP Unknown)   SpO2 98%     Past Medical History:    Past Medical History:   Diagnosis Date   • Arthritis    • Asthma due to seasonal allergies    • Cervical dysplasia    • Insomnia    • Osteopenia    • PONV (postoperative nausea and vomiting)    • Right hip pain        Past Surgical History:   Past Surgical History:   Procedure Laterality Date   • APPENDECTOMY     • CERVICAL CONE BIOPSY     • CERVICAL POLYPECTOMY     • COLONOSCOPY N/A 2020    Procedure: COLONOSCOPY TO CECUM;  Surgeon: Roseanne Cavanaugh MD;  Location: [facility] ENDOSCOPY;  Service: General;  Laterality: N/A;  PRE: SCREENING AND FAMILY H/O COLON CANCER  POST: POOR PREP   • DILATATION AND CURETTAGE     • SPINAL FUSION         Social History:    Social History     Socioeconomic History   • Marital status: Unknown   Tobacco Use   • Smoking status: Former Smoker     Packs/day: 1.00     Years: 25.00     Pack years: 25.00     Types: Cigarettes     Quit date: 10/29/2003     Years since quittin.4   • Smokeless tobacco: Never Used   Vaping Use   • Vaping Use: Never used   Substance and Sexual Activity   • Alcohol use: No   • Drug use: No   • Sexual activity: Yes     Partners: Male     Birth control/protection: Post-menopausal       Family  History:    Family History   Problem Relation Age of Onset   • Kidney disease Mother    • Colon cancer Father 87   • Prostate cancer Father 65   • Breast cancer Neg Hx    • Malig Hyperthermia Neg Hx        Current Medications:  Scheduled Meds:ceFAZolin, 2 g, Intravenous, Once  Orthopedic surgery custom cocktail, , Injection, Once  Scopolamine, 1 patch, Transdermal, Once  sodium chloride, 3 mL, Intravenous, Q12H      Continuous Infusions:lactated ringers, 9 mL/hr, Last Rate: 9 mL/hr (04/14/22 0602)      PRN Meds:.fentanyl  •  lidocaine PF 1%  •  midazolam  •  sodium chloride    Current Facility-Administered Medications:   •  ceFAZolin in dextrose (ANCEF) IVPB solution 2 g, 2 g, Intravenous, Once, Randal Covarrubias MD  •  fentaNYL citrate (PF) (SUBLIMAZE) injection 50 mcg, 50 mcg, Intravenous, Q10 Min PRN, Milton Hui MD  •  lactated ringers infusion, 9 mL/hr, Intravenous, Continuous, Milton Hui MD, Last Rate: 9 mL/hr at 04/14/22 0602, 9 mL/hr at 04/14/22 0602  •  lidocaine PF 1% (XYLOCAINE) injection 0.5 mL, 0.5 mL, Injection, Once PRN, Milton Hui MD  •  midazolam (VERSED) injection 0.5 mg, 0.5 mg, Intravenous, Q10 Min PRN, Milton Hui MD  •  ropivacaine 0.5 % 50 mL, Morphine 5 mg, ketorolac 30 mg, EPINEPHrine 1 mg/mL 0.3 mg in sodium chloride 0.9 % 50 mL solution, , Injection, Once, Randal Covarrubias MD  •  scopolamine patch 1 mg/72 hr, 1 patch, Transdermal, Once, Milton Hui MD, 1 patch at 04/14/22 0621  •  sodium chloride 0.9 % flush 3 mL, 3 mL, Intravenous, Q12H, Milton Hui MD  •  sodium chloride 0.9 % flush 3-10 mL, 3-10 mL, Intravenous, PRN, Milton Hui MD    Allergies:  No Known Allergies    Review of Systems:   HEENT: Patient denies any headaches, vision changes, change in hearing, or tinnitus, Patient denies any rhinorrhea,epistaxis, sinus pain, mouth or dental problems, sore throat or hoarseness, or dysphagia  Pulmonary:  Patient denies any cough, congestion, SOA, or wheezing  Cardiovascular: Patient denies any chest pain, dyspnea, palpitations, weakness, intolerance of exercise, varicosities, swelling of extremities, known murmur  Gastrointestinal:  Patient denies nausea, vomiting, diarrhea, constipation, loss  of appetite, change in appetite, dysphagia, gas, heartburn, melena, change in bowel habits, use of laxatives or other drugs to alter the function of the gastrointestinal tract.  Genital/Urinary: Patient denies dysuria, change in color of urine, change in frequency of urination, pain with urgency, incontinence, retention, or nocturia.  Musculoskeletal: Patient denies increased warmth; redness; or swelling of joints; limitation of function; deformity; crepitation: pain in a joint or an extremity, the neck, or the back, especially with movement.  Neurological: Patient denies dizziness, tremor, ataxia, difficulty in speaking, change in speech, paresthesia, loss of sensation, seizures, syncope, changes in memory.  Endocrine system: Patient denies tremors, palpitations, intolerance of heat or cold, polyuria, polydipsia, polyphagia, diaphoresis, exophthalmos, or goiter.  Psychological: Patient denies thoughts/plans or harming self or other; depression,  insomnia, night terrors, zohra, memory loss, disorientation.  Skin: Patient denies any bruising, rashes, discoloration, pruritus, wounds, ulcers, decubiti, changes in the hair or nails  Hematopoietic: Patient denies history of spontaneous or excessive bleeding, epistaxis, hematuria, melena, fatigue, enlarged or tender lymph nodes, pallor, history of anemia.        Physical Exam:  Awake, A&O x3, affect normal, no acute distress  Ambulating with a limp due to hip pain  Hip ROM is limited due to pain  No instability  Strength is 4/5 in the quad, hamstring, abduction and adduction  Cap refill is normal, Sensation intact    Card:  RR, HD Stable  Pulm:  Regular breathing, no S.O.A  Abd:   Soft, NT, ND    Lab Results (last 24 hours)     ** No results found for the last 24 hours. **          Peripheral Block    Result Date: 4/14/2022  Narrative: Milton Hui MD     4/14/2022  6:01 AM Peripheral Block Pre-sedation assessment completed: 4/14/2022 6:00 AM Patient reassessed immediately prior to procedure Patient location during procedure: holding area Start time: 4/14/2022 6:00 AM Stop time: 4/14/2022 6:10 AM Reason for block: at surgeon's request and post-op pain management Performed by Anesthesiologist: Milton Hui MD Preanesthetic Checklist Completed: patient identified, IV checked, site marked, risks and benefits discussed, surgical consent, monitors and equipment checked, pre-op evaluation and timeout performed Prep: Pt Position: supine Sterile barriers:cap, gloves, gown, mask and sterile barriers Prep: ChloraPrep Patient monitoring: blood pressure monitoring, continuous pulse oximetry and EKG Procedure Sedation: yes Performed under: local infiltration Guidance:ultrasound guided ULTRASOUND INTERPRETATION. Using ultrasound guidance a 21 G gauge needle was placed in close proximity to the nerve, at which point, under ultrasound guidance anesthetic was injected in the area of the nerve and spread of the anesthesia was seen on ultrasound in close proximity thereto.  There were no abnormalities seen on ultrasound; a digital image was taken; and the patient tolerated the procedure with no complications. Images:still images obtained Laterality:right Block Type:fascia iliaca compartment Injection Technique:single-shot Needle Type:echogenic Needle Gauge:21 G Medications Used: Mepivacaine HCl (PF) (CARBOCAINE) 1.5 % injection, 15 mL ropivacaine (NAROPIN) 0.5 % injection, 20 mL Medications Comment:Ultrasound Interpretation: Using ultrasound guidance, the needle was placed in close proximity to the target nerve and anesthetic was injected in the area of the target nerve and/or bundles, and  spread of the anesthetic was seen on ultrasound in close proximity thereto.  There were no abnormalities seen on ultrasound; a digital / physical image was taken; and the patient tolerated the procedure with no complications. Block placed for postoperative pain control per surgeon request. Post Assessment Injection Assessment: negative aspiration for heme, no paresthesia on injection and incremental injection Patient Tolerance:comfortable throughout block Complications:no     XR Chest PA & Lateral, XR Hip With or Without Pelvis 2 - 3 View Right    Result Date: 3/31/2022  Narrative: TWO-VIEW RIGHT HIP AND ONE VIEW AP PELVIS  HISTORY: Hip pain. Preop for hip replacement.  FINDINGS: There are extremely severe degenerative changes involving the right hip more than left with marked joint space narrowing and subchondral degenerative sclerosis and cystic change involving the femoral head.  TWO-VIEW CHEST  HISTORY: Preop for hip surgery. Occasional cough.  FINDINGS: The lungs are well-expanded and clear and the heart and hilar structures appear normal. There is a very slight thoracic scoliosis convex to the right.  This report was finalized on 3/31/2022 2:09 PM by Dr. Kenneth Kyle M.D.          Assessment:  End-stage Primary Right Hip Osteoarthritis    Plan:  Patient's pain is becoming disabling, despite extensive conservative treatment.  Radiographs reveal end-stage degenerative changes.  The risks of surgery, including, but not limited to, heart attack, stroke, dying, DVT, leg length inequality, nerve injury, vascular injury, stiffness and infection were discussed.  The alternatives and benefits were also discussed.  All questions answered and the patient wishes to proceed with right total hip arthroplasty.    Randal Melendez PA-C  [facility] [address]  [phone]    4/14/2022    CC: Jarod Hair MD, Randal Covarrubias MD

## 2022-04-14 NOTE — PLAN OF CARE
Goal Outcome Evaluation:      VSS- POD 0 of a Rt total hip arthroplasty. Pt had some post op nausea and vomitng which was relieved by Pheneran oral. Pt now eating drinking and voiding well. Pt ambulated in room and sat  in chair for a couple of hours without distress. Plan to d/c to home in the AM. Pain controlled with oral pain meds.

## 2022-04-14 NOTE — ANESTHESIA PROCEDURE NOTES
Peripheral Block    Pre-sedation assessment completed: 4/14/2022 6:00 AM    Patient reassessed immediately prior to procedure    Patient location during procedure: holding area  Start time: 4/14/2022 6:00 AM  Stop time: 4/14/2022 6:10 AM  Reason for block: at surgeon's request and post-op pain management  Performed by  Anesthesiologist: Milton Hui MD  Preanesthetic Checklist  Completed: patient identified, IV checked, site marked, risks and benefits discussed, surgical consent, monitors and equipment checked, pre-op evaluation and timeout performed  Prep:  Pt Position: supine  Sterile barriers:cap, gloves, gown, mask and sterile barriers  Prep: ChloraPrep  Patient monitoring: blood pressure monitoring, continuous pulse oximetry and EKG  Procedure    Sedation: yes  Performed under: local infiltration  Guidance:ultrasound guided    ULTRASOUND INTERPRETATION. Using ultrasound guidance a 21 G gauge needle was placed in close proximity to the nerve, at which point, under ultrasound guidance anesthetic was injected in the area of the nerve and spread of the anesthesia was seen on ultrasound in close proximity thereto.  There were no abnormalities seen on ultrasound; a digital image was taken; and the patient tolerated the procedure with no complications. Images:still images obtained    Laterality:right  Block Type:fascia iliaca compartment  Injection Technique:single-shot  Needle Type:echogenic  Needle Gauge:21 G      Medications Used: Mepivacaine HCl (PF) (CARBOCAINE) 1.5 % injection, 15 mL  ropivacaine (NAROPIN) 0.5 % injection, 20 mL      Medications  Comment:Ultrasound Interpretation: Using ultrasound guidance, the needle was placed in close proximity to the target nerve and anesthetic was injected in the area of the target nerve and/or bundles, and spread of the anesthetic was seen on ultrasound in close proximity thereto.  There were no abnormalities seen on ultrasound; a digital / physical image was  taken; and the patient tolerated the procedure with no complications.   Block placed for postoperative pain control per surgeon request.    Post Assessment  Injection Assessment: negative aspiration for heme, no paresthesia on injection and incremental injection  Patient Tolerance:comfortable throughout block  Complications:no

## 2022-04-14 NOTE — OP NOTE
Orthopaedic Surgery Operative Note    Patient Name:  Kaci Patrick  YOB: 1955  Age: 66 y.o.  Medical Records Number:  [id number]    Date of Procedure:  4/14/2022    Pre-operative Diagnosis:  Primary Osteoarthritis Right Hip    Post-operative Diagnosis:  Primary Osteoarthritis Right Hip    Procedure Performed:  Right Total Hip Arthroplasty                                          Layered Closure    Implants:  Biomet 52 mm G7 Acetabular Shell, 12 mm Standard Offset Taperloc Complete Stem, 42 mm Dual Mobility Metal Liner, -3 28 mm Ceramic Head/42 mm Dual Mobility Polyethylene Head    Surgeon:  Randal Covarrubias M.D.    Assistant: Hyacinth Melendez (who was present during the critical portions of the case, thereby decreasing operative time and patient morbidity)    Anesthetic Type:  General    Estimated Blood Loss:  250cc's    Specimens: * No orders in the log *    No Complications      Indications for Procedure:  Kaci Patrick is a 66 y.o. female suffering from end stage degenerative changes in the right hip.  The patients pain is becoming disabling, despite extensive conservative care, including NSAIDS, activity modification and therapy.  The risks, benefits and alternatives were discussed and the patient wishes to proceed with right total hip arthroplasty.      Procedure Performed:    After informed consent was obtained, the correct patient identified, the correct operative side marked by the operative surgeon and pre-operative IV antibiotics given, the patient was taken to the operating room and placed supine on the operating table.  After general anesthesia was induced, a surgical time out was performed and 1 gm of IV Tranxemic Acid given, the patient was placed in the left lateral decubitus position and the right lower extremity was prepped with chloraprep and draped in a sterile fashion.    An incision was made overlying the right hip.  We sharply dissected down to expose the fascia over the  gluteus adolph and the Iliotibial band.  We split the fascia in line with the skin incision and placed a Charnley retractor carefully to avoid damage to the Sciatic Nerve.  We then began our posterior approach to the hip by identifying the short external rotators and tagging these with a #2 MaxBraid and releasing them from their insertion on the femur.  We then identified the posterior capsule, released the capsule from it's insertion on the femur and tagged the capsule proximally and distally with a #2 MaxBraid  We then dislocated the hip and made our neck cut roughly 2-3 mm proximal to the lesser trochanter. We measured the head to be 48 mm and our neck cut to be 55 mm.      We then gained exposure of the acetabulum, removed the pulvinar and labrum, then we sequentially reamed from a 36 mm reamer up to a 51 mm reamer.  We had excellent interference fit and a nice bleeding, bony bed for our acetabular component.  We copiously irrigated the acetabulum, then impacted our permanent acetabular component into place.  We assured we were completely seated by checking through the apical hole, we placed two 6.5 mm cancellous screws for rotational stability, then placed our permanent liner.    We then turned our attention to the femur where we cleared the trochanteric fossa of soft tissue.  We entered the canal with a box chisel, hand held reamer and lateralizing reamer.  We then sequentially broached from a 4 mm broach up to a 12 mm broach.  We trialed with both a standard neck and high offset neck with the -3 28/42  mm head and had excellent stability, range of motion and leg length equality with the std offset neck.  An intraoperative radiograph revealed excellent implant position and alignment.  We removed our trials and copiously irrigated the hip.  We then placed our permanent 12 std offset stem and trialed again with a -6, -3 and std 28/42 mm heads.  The -3 28/42 mm head gave us the best range of motion, stability  and leg length equality.  We removed the trials, copiously irrigated the hip with dilute betadine solution, cleaned and dried the trunion, then impacted our permanent head.  We then reduced the hip and began our layered closure after placing our local anesthetic and re-dosing our IV antibiotics.  We closed the short external rotators and posterior capsule through two drill holes in the greater trochanter and a soft tissue stitch in the Gluteus Medius.  We closed the deep fascia with a #1 Vicryl, the deep subcutaneous tissue with a #1 Vicryl, the subcutaneous tissue with 2-0 Vicryl and the skin with 3-0 Monocryl and a Zip-navjot.  We placed a sterile dressing of Xeroform and an Island dressing.  All sponge and needle counts were correct.  The patient was awakened from general anesthesia and taken to the recovery room in stable condition.    The patient will be started on Aspirin 325 mg twice daily for DVT prophylaxis.  IV antibiotics will be discontinued within 24 hours of surgery.  Immediately prior to surgery, there were no acute Thromboembolic nor Cardiovascular risk factors.  An updated Medical Reconciliation form is on the chart.    Randal Melendez PA-C  [facility]  [address] [place] [address] [id number] [phone]    4/14/2022

## 2022-04-14 NOTE — ANESTHESIA PREPROCEDURE EVALUATION
Anesthesia Evaluation     Patient summary reviewed and Nursing notes reviewed   history of anesthetic complications: PONV               Airway   Mallampati: II  TM distance: >3 FB  Neck ROM: limited  Small opening  Dental      Pulmonary    (+) a smoker Former, asthma,  Cardiovascular - negative cardio ROS    ECG reviewed  Rhythm: regular  Rate: normal        Neuro/Psych- negative ROS  GI/Hepatic/Renal/Endo - negative ROS     Musculoskeletal     Abdominal    Substance History - negative use     OB/GYN negative ob/gyn ROS         Other   arthritis,                      Anesthesia Plan    ASA 3     general with block   (Right FIC block PSR for POPC    I have reviewed the patient's history with the patient and the chart, including all pertinent laboratory results and imaging. I have explained the risks of anesthesia including but not limited to dental damage, corneal abrasion, nerve injury, MI, stroke, and death. Questions asked and answered. Anesthetic plan discussed with patient and team as indicated. Patient expressed understanding of the above.  )  intravenous induction     Anesthetic plan, all risks, benefits, and alternatives have been provided, discussed and informed consent has been obtained with: patient.        CODE STATUS:

## 2022-04-14 NOTE — ANESTHESIA PROCEDURE NOTES
Airway  Urgency: elective    Date/Time: 4/14/2022 7:05 AM  Airway not difficult    General Information and Staff    Patient location during procedure: OR  Anesthesiologist: Milton Hui MD  CRNA: Roberta Laird CRNA    Indications and Patient Condition  Indications for airway management: airway protection    Preoxygenated: yes  Mask difficulty assessment: 2 - vent by mask + OA or adjuvant +/- NMBA    Final Airway Details  Final airway type: endotracheal airway      Successful airway: ETT  Cuffed: yes   Successful intubation technique: direct laryngoscopy  Facilitating devices/methods: intubating stylet and cricoid pressure  Endotracheal tube insertion site: oral  Blade: Christi  Blade size: 3  ETT size (mm): 7.0  Cormack-Lehane Classification: grade I - full view of glottis  Placement verified by: chest auscultation and capnometry   Cuff volume (mL): 7  Measured from: lips  ETT/EBT  to lips (cm): 21  Number of attempts at approach: 1  Assessment: lips, teeth, and gum same as pre-op and atraumatic intubation    Additional Comments  Airway exam prior to DL, teeth/lips inspected. Preoxygenated with 100% O2; sniffing position, easy mask ventilation. Eyes taped. Atraumatic intubation. Lips and teeth intact, no damage. ETT connected to vent. Confirmed EBBS, +EtCO2.

## 2022-04-14 NOTE — DISCHARGE SUMMARY
Orthopaedic Surgery Discharge Summary    Patient Name:  Kaci Patrick  YOB: 1955  Age: 66 y.o.  Medical Records Number:  [id number]    Date of Admission:  4/14/2022  Date of Discharge:  4/15/2022    Primary Discharge Diagnosis:  Primary osteoarthritis of right hip [M16.11]    Secondary Discharge Diagnosis:    Problems Addressed this Visit        Musculoskeletal and Injuries    Primary osteoarthritis of right hip - Primary    Relevant Medications    oxyCODONE-acetaminophen (PERCOCET) 5-325 MG per tablet    Other Relevant Orders    Walker      Diagnoses       Codes Comments    Primary osteoarthritis of right hip    -  Primary ICD-10-CM: M16.11  ICD-9-CM: 715.15           Procedures Performed:  Right Total Hip Arthroplasty      Hospital Course:    Kaci Patrick is a 66 y.o.  female who underwent successful right robinson on 4/14/2022.  Kaci Patrick was started on Aspirin 325 mg po twice daily  post-operatively for DVT prophylaxis.  On post-op day 1 the patients dressing was changed and their incision was clean, with no signs of infection and their calf was soft, with no signs of DVT.  The patient progressed well with physical therapy and the patients hemoglobin remained stable. On post-operative day 1 the patient was felt ready for discharge.     Vitals:  Vitals:    04/14/22 1110 04/14/22 1300 04/14/22 1303 04/14/22 1538   BP: 142/76  112/61    BP Location:       Patient Position:       Pulse: 85 72     Resp: 16  16    Temp: 96.7 °F (35.9 °C)  96.1 °F (35.6 °C) 97.7 °F (36.5 °C)   TempSrc: Oral Oral     SpO2: 97%  97%        Discharge Medications:      Discharge Medications      New Medications      Instructions Start Date   aspirin  MG tablet   325 mg, Oral, 2 Times Daily With Meals   Start Date: April 15, 2022     docusate sodium 250 MG capsule  Commonly known as: COLACE   250 mg, Oral, 2 Times Daily PRN      oxyCODONE-acetaminophen 5-325 MG per tablet  Commonly known as: PERCOCET   1-2  tablets, Oral, Every 4 Hours PRN         Changes to Medications      Instructions Start Date   estradiol 0.1 MG/GM vaginal cream  Commonly known as: ESTRACE VAGINAL  What changed:   · when to take this  · reasons to take this   2 g, Vaginal, Daily      ibandronate 150 MG tablet  Commonly known as: BONIVA  What changed: additional instructions   150 mg, Oral, Every 30 Days         Continue These Medications      Instructions Start Date   albuterol sulfate  (90 Base) MCG/ACT inhaler  Commonly known as: PROVENTIL HFA;VENTOLIN HFA;PROAIR HFA   2 puffs, Inhalation, Every 4 Hours PRN      amitriptyline 25 MG tablet  Commonly known as: ELAVIL   25 mg, Oral, Nightly      betamethasone (augmented) 0.05 % cream  Commonly known as: DIPROLENE   1 application, Topical, 2 Times Daily PRN      calcium citrate-vitamin D 315-200 MG-UNIT per tablet  Commonly known as: CITRACAL+D   1 tablet, Oral, Daily, HOLDING FOR 7 DAYS PRIOR TO DOS      celecoxib 200 MG capsule  Commonly known as: CeleBREX   200 mg, Oral, Daily, FOLLOW MD GUIDELINE ON HOLDING FOR DOS      cetirizine 10 MG tablet  Commonly known as: zyrTEC   10 mg, Oral, Daily      cyclobenzaprine 10 MG tablet  Commonly known as: FLEXERIL   10 mg, Oral, 3 Times Daily PRN      fluticasone 50 MCG/ACT nasal spray  Commonly known as: FLONASE   1 spray, Nasal, Daily      melatonin 5 MG tablet tablet   5 mg, Oral, Nightly      montelukast 10 MG tablet  Commonly known as: SINGULAIR   10 mg, Oral, Nightly      multivitamin tablet tablet  Generic drug: multivitamin   1 tablet, Oral, Daily, HOLDING FOR 7 DAYS PRIOR TO DOS      Sodium Fluoride 5000 PPM 1.1 % paste  Generic drug: Sodium Fluoride   1 application, Dental, See Admin Instructions      traMADol 50 MG tablet  Commonly known as: ULTRAM   50 mg, Oral, Every 12 Hours PRN         Stop These Medications    CHLORHEXIDINE GLUCONATE CLOTH EX     HYDROcodone-acetaminophen 7.5-325 MG per tablet  Commonly known as: NORCO     mupirocin 2  % ointment  Commonly known as: BACTROBAN            Pain Medications:  Percocet 5/325 mg 1-2 po q 4-6 hours prn pain    DVT Prophylaxis:  Enteric Coated Aspirin 325 mg po twice daily for 2 weeks, then one daily for 4 weeks    Total Hip Replacement Discharge Instructions:    I. ACTIVITIES:  1. Exercises:  ? Complete exercise program as taught by the hospital physical therapist 2 times per day  ? Exercise program will be advanced by the physical therapist  ? During the day be up ambulating every 2 hours (while awake) for short distances  ? Complete the ankle pump exercises at least 10 times per hour (while awake)  ? Elevate legs when in bed and for at least 30 minutes during the day.Use cold packs 20-30 minutes approximately 5 times per day. This should be done before and after completing your exercises and at any time you are experiencing pain/ stiffness in your operative extremity.      2. Activities of Daily Living:  ? No tub baths, hot tubs, or swimming pools for 4 weeks  ? May shower and let water run over the incision on post-operative day #5 if no drainage. Do not scrub or rub the incision. Simply let the water run over the incision and pat dry.    II. Restrictions  ? Continue hip precautions as taught at the hospital  ? Your surgeon will discuss with you when you will be able to drive again.  ? Weight bearing is as tolerated  ? First week stay inside on even terrain. May go up and down stairs one stair at a time utilizing the hand rail once cleared by physical therapy to do so.  ? After one week, you may venture outside (if cleared to do so by physical therapist).    III. Precautions:  ? Everyone that comes near you should wash their hands  ? No elective dental, genital-urinary, or colon procedures or surgical procedures for 12 weeks after surgery unless absolutely necessary.  ?  If dental work or surgical procedure is deemed absolutely necessary, you will need to contact your surgeon as you will need to take  antibiotics 1 hour prior to any dental work (including teeth cleanings).  ? Please discuss with your surgeon prophylactic antibiotics as the length of time this intervention will be necessary for you varies with each patient’s health history and situation.  ? Avoid sick people. If you must be around someone who is ill, they should wear a mask.  ? Avoid visits to the Emergency Room or Urgent Care unless you are having a life threatening event.   ? If ordered stockings are to be placed on in the morning and removed at night. Monitor the stockings to ensure that any swelling is not causing the stockings to become too tight. In this case, remove stockings immediately.    IV. INCISION CARE:  ? Wash your hands prior to dressing changes  ? Change the dressing as needed to keep incision clean and dry. Utilize dry gauze and paper tape. Avoid touching the side of the gauze that goes against the incision with your hands.  ? No creams or ointments to the incision  ? May remove dressing once the incision is free of drainage  ? Do not touch or pick at the incision  ? Check incision every day and notify surgeon immediately if any of the following signs or symptoms are noted:  o Increase in redness  o Increase in swelling around the incision and of the entire extremity  o Increase in pain  o Drainage oozing from the incision  o Pulling apart of the edges of the incision  o Increase in overall body temperature (greater than 100.5 degrees)  ? Your surgeon will instruct you regarding suture or staple removal    V. Medications:   1. Anticoagulants: You will be discharged on an anticoagulant. This is a prophylactic medication that helps prevent blood clots during your post-operative period. The type and length of dosage varies based on your individual needs, procedure performed, and surgeon’s preference.  ? While taking the anticoagulant, you should avoid taking any additional aspirin, ibuprofen (Advil or Motrin), Aleve (Naprosyn) or  other non-steroidal anti-inflammatory medications.   ? Notify surgeon immediately if any mika bleeding is noted in the urine, stool, emesis, or from the nose or the incision. Blood in the stool will often appear as black rather than red. Blood in urine may appear as pink. Blood in emesis may appear as brown/black like coffee grounds.  ? You will need to apply pressure for longer periods of time to any cuts or abrasions to stop bleeding  ? Avoid alcohol while taking anticoagulants    2. Stool Softeners: You will be at greater risk of constipation after surgery due to being less mobile and the pain medications.   ? Take stool softeners as instructed by your surgeon while on pain medications. Over the counter Colace 100 mg 1-2 capsules twice daily.   ? If stools become too loose or too frequent, please decreases the dosage or stop the stool softener.  ? If constipation occurs despite use of stool softeners, you are to continue the stool softeners and add a laxative (Milk of Magnesia 1 ounce daily as needed)  ? Drink plenty of fluids, and eat fruits and vegetables during your recovery time    3. Pain Medications utilized after surgery are narcotics and the law requires that the following information be given to all patients that are prescribed narcotics:  ? CLASSIFICATION: Pain medications are called Opioids and are narcotics  ? LEGALITIES: It is illegal to share narcotics with others and to drive within 24 hours of taking narcotics  ? POTENTIAL SIDE EFFECTS: Potential side effects of opioids include: nausea, vomiting, itching, dizziness, drowsiness, dry mouth, constipation, and difficulty urinating.  ? POTENTIAL ADVERSE EFFECTS:   o Opioid tolerance can develop with use of pain medications and this simply means that it requires more and more of the medication to control pain; however, this is seen more in patients that use opioids for longer periods of time.  o Opioid dependence can develop with use of Opioids and  this simply means that to stop the medication can cause withdrawal symptoms; however, this is seen with patients that use Opioids for longer periods of time.  o Opioid addiction can develop with use of Opioids and the incidence of this is very unlikely in patients who take the medications as ordered and stop the medications as instructed.  o Opioid overdose can be dangerous, but is unlikely when the medication is taken as ordered and stopped when ordered. It is important not to mix opioids with alcohol or with and type of sedative such as Benadryl as this can lead to over sedation and respiratory difficulty.  ? DOSAGE:   o Pain medications will need to be taken consistently for the first week to decrease pain and promote adequate pain relief and participation in physical therapy.  o After the initial surgical pain begins to resolve, you may begin to decrease the pain medication. By the end of 6-8 weeks, you should be off of pain medications.  o Refills will not be given by the office during evening hours, on weekends, or after 6-8 weeks post-op.  o To seek refills on pain medications during the initial 6 week post-operative period, you must call the office 48 hours in advance to request the refill. The office will then notify you when to  the prescription. DO NOT wait until you are out of the medication to request a refill.    V. FOLLOW-UP VISITS:  ? You will need to follow up in the office with your surgeon in 3 weeks. Please call this number [phone]  to schedule this appointment.  If you have any concerns or suspected complications prior to your follow up visit, please call your surgeons office. Do not wait until your appointment time if you suspect complications. These will need to be addressed in the office promptly.    Randal Melendez PA-C  [facility]  [address] [place] [address]  [phone]    4/14/2022    CC:Jarod Hair MD:Randal LIU  MD Satish

## 2022-04-15 VITALS
SYSTOLIC BLOOD PRESSURE: 126 MMHG | RESPIRATION RATE: 16 BRPM | TEMPERATURE: 97.4 F | HEART RATE: 106 BPM | HEIGHT: 60 IN | DIASTOLIC BLOOD PRESSURE: 71 MMHG | BODY MASS INDEX: 21.26 KG/M2 | OXYGEN SATURATION: 95 % | WEIGHT: 108.3 LBS

## 2022-04-15 LAB
ANION GAP SERPL CALCULATED.3IONS-SCNC: 7.4 MMOL/L (ref 5–15)
BUN SERPL-MCNC: 16 MG/DL (ref 8–23)
BUN/CREAT SERPL: 24.2 (ref 7–25)
CALCIUM SPEC-SCNC: 8.4 MG/DL (ref 8.6–10.5)
CHLORIDE SERPL-SCNC: 107 MMOL/L (ref 98–107)
CO2 SERPL-SCNC: 23.6 MMOL/L (ref 22–29)
CREAT SERPL-MCNC: 0.66 MG/DL (ref 0.57–1)
DEPRECATED RDW RBC AUTO: 40.1 FL (ref 37–54)
EGFRCR SERPLBLD CKD-EPI 2021: 95.7 ML/MIN/1.73
ERYTHROCYTE [DISTWIDTH] IN BLOOD BY AUTOMATED COUNT: 11.6 % (ref 12.3–15.4)
GLUCOSE SERPL-MCNC: 105 MG/DL (ref 65–99)
HCT VFR BLD AUTO: 31.2 % (ref 34–46.6)
HGB BLD-MCNC: 10.4 G/DL (ref 12–15.9)
MCH RBC QN AUTO: 31.6 PG (ref 26.6–33)
MCHC RBC AUTO-ENTMCNC: 33.3 G/DL (ref 31.5–35.7)
MCV RBC AUTO: 94.8 FL (ref 79–97)
PLATELET # BLD AUTO: 291 10*3/MM3 (ref 140–450)
PMV BLD AUTO: 8.6 FL (ref 6–12)
POTASSIUM SERPL-SCNC: 4.1 MMOL/L (ref 3.5–5.2)
RBC # BLD AUTO: 3.29 10*6/MM3 (ref 3.77–5.28)
SODIUM SERPL-SCNC: 138 MMOL/L (ref 136–145)
WBC NRBC COR # BLD: 11.34 10*3/MM3 (ref 3.4–10.8)

## 2022-04-15 PROCEDURE — 97165 OT EVAL LOW COMPLEX 30 MIN: CPT

## 2022-04-15 PROCEDURE — G0378 HOSPITAL OBSERVATION PER HR: HCPCS

## 2022-04-15 PROCEDURE — 97530 THERAPEUTIC ACTIVITIES: CPT

## 2022-04-15 PROCEDURE — 97110 THERAPEUTIC EXERCISES: CPT

## 2022-04-15 PROCEDURE — 97535 SELF CARE MNGMENT TRAINING: CPT

## 2022-04-15 PROCEDURE — 85027 COMPLETE CBC AUTOMATED: CPT | Performed by: ORTHOPAEDIC SURGERY

## 2022-04-15 PROCEDURE — 80048 BASIC METABOLIC PNL TOTAL CA: CPT | Performed by: ORTHOPAEDIC SURGERY

## 2022-04-15 RX ADMIN — OXYCODONE HYDROCHLORIDE AND ACETAMINOPHEN 2 TABLET: 5; 325 TABLET ORAL at 08:37

## 2022-04-15 RX ADMIN — MUPIROCIN 1 APPLICATION: 20 OINTMENT TOPICAL at 08:37

## 2022-04-15 RX ADMIN — Medication 10 ML: at 08:37

## 2022-04-15 RX ADMIN — FERROUS SULFATE TAB 325 MG (65 MG ELEMENTAL FE) 325 MG: 325 (65 FE) TAB at 08:37

## 2022-04-15 RX ADMIN — OXYCODONE HYDROCHLORIDE AND ACETAMINOPHEN 2 TABLET: 5; 325 TABLET ORAL at 02:03

## 2022-04-15 RX ADMIN — CETIRIZINE HYDROCHLORIDE 10 MG: 10 TABLET ORAL at 08:37

## 2022-04-15 RX ADMIN — FLUTICASONE PROPIONATE 1 SPRAY: 50 SPRAY, METERED NASAL at 08:37

## 2022-04-15 RX ADMIN — ASPIRIN 325 MG: 325 TABLET, COATED ORAL at 08:37

## 2022-04-15 NOTE — PLAN OF CARE
Goal Outcome Evaluation:  Plan of Care Reviewed With: patient           Outcome Evaluation: Pt POD #1 for R DINO.  OT ed pt on use of AE for LBD and pt return demo and plans to obtain on own.  Pt is SBA for mobility and xfers at walker level. Pt plans to d/c home today with assist from family.

## 2022-04-15 NOTE — PLAN OF CARE
Goal Outcome Evaluation:           Progress: improving   Pt is a post op day 1 of a rt total hip, posterior approach. Dressing is clean dry and intact. Pt continues with the island dressing. Pt continues with PO pain meds that provide relief. Pt has ambulated to the bathroom with an assist x1. Voiding function intact. Dried drainage noted to dressing. Pt is resting at this time, will continue to monitor.

## 2022-04-15 NOTE — THERAPY DISCHARGE NOTE
Acute Care - Occupational Therapy Discharge  [facility]    Patient Name: Kaci Patrick  : 1955    MRN: [id number]                              Today's Date: 4/15/2022       Admit Date: 2022    Visit Dx:     ICD-10-CM ICD-9-CM   1. Primary osteoarthritis of right hip  M16.11 715.15     Patient Active Problem List   Diagnosis   • Screening for colon cancer   • Family history of colon cancer in father   • Primary osteoarthritis of right hip     Past Medical History:   Diagnosis Date   • Arthritis    • Asthma due to seasonal allergies    • Cervical dysplasia    • Insomnia    • Osteopenia    • PONV (postoperative nausea and vomiting)    • Right hip pain      Past Surgical History:   Procedure Laterality Date   • APPENDECTOMY     • CERVICAL CONE BIOPSY     • CERVICAL POLYPECTOMY     • COLONOSCOPY N/A 2020    Procedure: COLONOSCOPY TO CECUM;  Surgeon: Roseanne Cavanaugh MD;  Location: [facility] ENDOSCOPY;  Service: General;  Laterality: N/A;  PRE: SCREENING AND FAMILY H/O COLON CANCER  POST: POOR PREP   • DILATATION AND CURETTAGE     • SPINAL FUSION     • TOTAL HIP ARTHROPLASTY Right 2022    Procedure: RIGHT TOTAL HIP ARTHROPLASTY;  Surgeon: Randal Covarrubias MD;  Location: [facility] MAIN OR;  Service: Orthopedics;  Laterality: Right;      General Information     Row Name 04/15/22 1152          OT Time and Intention    Document Type evaluation;therapy note (daily note);discharge evaluation/summary  -LE     Mode of Treatment individual therapy;occupational therapy  -LE     Row Name 04/15/22 115          General Information    Patient Profile Reviewed yes  -LE     Prior Level of Function independent:  -LE     Existing Precautions/Restrictions fall;hip;right;hip, posterior  -LE     Row Name 04/15/22 1152          Living Environment    People in Home spouse;child(clay), adult  -LE     Row Name 04/15/22 1151          Cognition    Orientation Status (Cognition) oriented to;oriented x 4  -LE     Row Name  04/15/22 1152          Safety Issues, Functional Mobility    Comment, Safety Issues/Impairments (Mobility) no skid sock and gait belt.  -LE           User Key  (r) = Recorded By, (t) = Taken By, (c) = Cosigned By    Initials Name Provider Type    Sandra Sams OTR Occupational Therapist               Mobility/ADL's     Row Name 04/15/22 1153          Bed Mobility    Comment, (Bed Mobility) in chair when enter.  -     Row Name 04/15/22 1153          Transfers    Transfers sit-stand transfer  -LE     Sit-Stand [place] (Transfers) standby assist  -     Row Name 04/15/22 [facility]3          Sit-Stand Transfer    Assistive Device (Sit-Stand Transfers) walker, front-wheeled  -LE     Comment, (Sit-Stand Transfer) pt reports has been sitting on standard toilet and at 5' tall does not need raised commode  -     Row Name 04/15/22 1153          Functional Mobility    Functional Mobility- Comment reports has been walking to BR with walker without difficulty.  -     Row Name 04/15/22 1153          Activities of Daily Living    BADL Assessment/Intervention lower body dressing;upper body dressing;toileting  -     Row Name 04/15/22 1153          Mobility    Right Lower Extremity (Weight-bearing Status) weight-bearing as tolerated (WBAT)  -     Row Name 04/15/22 [facility]3          Lower Body Dressing Assessment/Training    [place] Level (Lower Body Dressing) don;pants/bottoms;shoes/slippers;socks;set up;verbal cues;minimum assist (75% patient effort);moderate assist (50% patient effort)  -LE     Position (Lower Body Dressing) supported sitting;unsupported standing  -LE     Comment, (Lower Body Dressing) intro AE and pt return demo. OT ed hip precautions and use of AE to adhere to precuations during lower body dressing.  -     Row Name 04/15/22 1153          Toileting Assessment/Training    Comment, (Toileting) denies difficulty with hygeine and manageing clothes. Ed benefit of pull on pants.  -LE           User Key   (r) = Recorded By, (t) = Taken By, (c) = Cosigned By    Initials Name Provider Type    Sandra Sams OTR Occupational Therapist               Obj/Interventions     Row Name 04/15/22 1151          Range of Motion Comprehensive    Comment, General Range of Motion functional B UE  -LE           User Key  (r) = Recorded By, (t) = Taken By, (c) = Cosigned By    Initials Name Provider Type    Sandra Sams OTR Occupational Therapist               Goals/Plan     Row Name 04/15/22 1200          Dressing Goal 1 (OT)    Activity/Device (Dressing Goal 1, OT) lower body dressing;reacher;sock-aid;long-handled shoe horn;dressing stick  -LE     New Point/Cues Needed (Dressing Goal 1, OT) moderate assist (50-74% patient effort);minimum assist (75% or more patient effort);set-up required;verbal cues required  -LE     Time Frame (Dressing Goal 1, OT) 1 day  -LE     Progress/Outcome (Dressing Goal 1, OT) goal met  -LE           User Key  (r) = Recorded By, (t) = Taken By, (c) = Cosigned By    Initials Name Provider Type    Sandra Sams OTR Occupational Therapist               Clinical Impression     Row Name 04/15/22 1158          Pain Assessment    Pretreatment Pain Rating 3/10  -LE     Posttreatment Pain Rating 3/10  -LE     Pain Location - Side/Orientation Right  -LE     Pain Location - hip  -LE     Pain Intervention(s) Repositioned;Rest  -LE     Row Name 04/15/22 115          Plan of Care Review    Plan of Care Reviewed With patient  -LE     Outcome Evaluation Pt POD #1 for R DINO.  OT ed pt on use of AE for LBD and pt return demo and plans to obtain on own.  Pt is SBA for mobility and xfers at walker level. Pt plans to d/c home today with assist from family.  -LE     Row Name 04/15/22 0406          Therapy Assessment/Plan (OT)    Therapy Frequency (OT) evaluation only  -LE     Row Name 04/15/22 1152          Therapy Plan Review/Discharge Plan (OT)    Equipment Needs Upon Discharge (OT) --  hip kit.  -     Row Name  04/15/22 1157          Vital Signs    O2 Delivery Pre Treatment room air  -LE     Pre Patient Position Sitting  -LE     Intra Patient Position Standing  -LE     Post Patient Position Sitting  -LE     Row Name 04/15/22 1157          Positioning and Restraints    Pre-Treatment Position sitting in chair/recliner  -LE     Post Treatment Position chair  -LE     In Chair reclined;call light within reach;encouraged to call for assist;exit alarm on  -LE           User Key  (r) = Recorded By, (t) = Taken By, (c) = Cosigned By    Initials Name Provider Type    Sandra Sams OTR Occupational Therapist               Outcome Measures     Row Name 04/15/22 1201          How much help from another is currently needed...    Putting on and taking off regular lower body clothing? 3  -LE     Bathing (including washing, rinsing, and drying) 3  -LE     Toileting (which includes using toilet bed pan or urinal) 3  -LE     Putting on and taking off regular upper body clothing 3  -LE     Taking care of personal grooming (such as brushing teeth) 3  -LE     Eating meals 4  -LE     AM-PAC 6 Clicks Score (OT) 19  -LE     Row Name 04/15/22 0837          How much help from another person do you currently need...    Turning from your back to your side while in flat bed without using bedrails? 4  -MN     Moving from lying on back to sitting on the side of a flat bed without bedrails? 3  -MN     Moving to and from a bed to a chair (including a wheelchair)? 3  -MN     Standing up from a chair using your arms (e.g., wheelchair, bedside chair)? 3  -MN     Climbing 3-5 steps with a railing? 2  -MN     To walk in hospital room? 3  -MN     AM-PAC 6 Clicks Score (PT) 18  -MN     Row Name 04/15/22 1201          Functional Assessment    Outcome Measure Options AM-PAC 6 Clicks Daily Activity (OT)  -LE           User Key  (r) = Recorded By, (t) = Taken By, (c) = Cosigned By    Initials Name Provider Type    Sandra Sams OTR Occupational Therapist    [place]  Wendy Beltran RN Registered Nurse              Occupational Therapy Education                 Title: PT OT SLP Therapies (Done)     Topic: Occupational Therapy (Done)     Point: ADL training (Done)     Description:   Instruct learner(s) on proper safety adaptation and remediation techniques during self care or transfers.   Instruct in proper use of assistive devices.              Learning Progress Summary           Patient Acceptance, E,TB,D, DU,VU by MISTY at 4/15/2022 1201                   Point: Home exercise program (Done)     Description:   Instruct learner(s) on appropriate technique for monitoring, assisting and/or progressing therapeutic exercises/activities.              Learning Progress Summary           Patient Acceptance, E,TB,D, DU,VU by MISTY at 4/15/2022 1201                   Point: Precautions (Done)     Description:   Instruct learner(s) on prescribed precautions during self-care and functional transfers.              Learning Progress Summary           Patient Acceptance, E,TB,D, DU,VU by MISTY at 4/15/2022 1201                               User Key     Initials Effective Dates Name Provider Type Discipline     06/16/21 -  Sandra Qureshi, OTR Occupational Therapist OT              OT Recommendation and Plan  Therapy Frequency (OT): evaluation only  Plan of Care Review  Plan of Care Reviewed With: patient  Outcome Evaluation: Pt POD #1 for R DINO.  OT ed pt on use of AE for LBD and pt return demo and plans to obtain on own.  Pt is SBA for mobility and xfers at walker level. Pt plans to d/c home today with assist from family.  Plan of Care Reviewed With: patient  Outcome Evaluation: Pt POD #1 for R DINO.  OT ed pt on use of AE for LBD and pt return demo and plans to obtain on own.  Pt is SBA for mobility and xfers at walker level. Pt plans to d/c home today with assist from family.     Time Calculation:    Time Calculation- OT     Row Name 04/15/22 1202             Time Calculation- OT    OT Start  Time 1049  -LE      OT Stop Time 1113  -LE      OT Time Calculation (min) 24 min  -LE      Total Timed Code Minutes- OT 14 minute(s)  -LE      OT Received On 04/15/22  -LE              Timed Charges    89232 - OT Self Care/Mgmt Minutes 14  -LE              Untimed Charges    OT Eval/Re-eval Minutes 10  -LE              Total Minutes    Timed Charges Total Minutes 14  -LE      Untimed Charges Total Minutes 10  -LE       Total Minutes 24  -LE            User Key  (r) = Recorded By, (t) = Taken By, (c) = Cosigned By    Initials Name Provider Type    Sandra Sams OTR Occupational Therapist              Therapy Charges for Today     Code Description Service Date Service Provider Modifiers Qty    [id number]  OT EVAL LOW COMPLEXITY 2 4/15/2022 Sandra Qureshi OTR GO 1    [id number]  OT SELF CARE/MGMT/TRAIN EA 15 MIN 4/15/2022 Sandra Qureshi OTR GO 1             OT Discharge Summary  Anticipated Discharge Disposition (OT): home with assist  Reason for Discharge: All goals achieved, Discharge from facility  Discharge Destination: Home with assist    KAREN Ellis  4/15/2022

## 2022-04-15 NOTE — PLAN OF CARE
Goal Outcome Evaluation:  Plan of Care Reviewed With: patient        Progress: improving  Outcome Evaluation: Pt in bed at beg of PT session and sat up to EOB req SBA and extra time. Pt stood 2x req SV and use of fww. Pt amb 12' to BR then 200' req CGA and use of fww. Pt very slow and antalgic w/ vc to incr R step length. Pt edc for and asc/dsc 10 stairs w/ 1 HR. No overt safety issues noted.  Pt limited by pain and fatigue. Pt plans to return home w/ assist of spouse and OP PT.    Patient was intermittently wearing a face mask during this therapy encounter. Therapist used appropriate personal protective equipment including mask and gloves.  Mask used was standard procedure mask. Appropriate PPE was worn during the entire therapy session. Hand hygiene was completed before and after therapy session. Patient is not   in enhanced droplet precautions.

## 2022-04-15 NOTE — PROGRESS NOTES
Orthopaedic Surgery  Progress Note  4/15/2022    Patients Name:  Kaci Patrick  YOB: 1955  Age: 66 y.o.  Medical Records Number:  [id number]  Date of Admission: 4/14/2022    No complaints except pain    Vitals:  Vitals:    04/14/22 1900 04/14/22 2300 04/15/22 0300 04/15/22 0700   BP: 111/68 114/72 109/64 126/71   BP Location: Right arm Right arm Right arm Right arm   Patient Position: Lying Lying Lying Lying   Pulse: 87 102 94 106   Resp: 16 16 16 16   Temp: 97.8 °F (36.6 °C) 98.6 °F (37 °C) 99.1 °F (37.3 °C) 97.4 °F (36.3 °C)   TempSrc: Oral Oral Oral Oral   SpO2: 98% 97% 98% 95%   Weight:       Height:           RLE:  NVI, calf nontender, sensation intact  No signs of DVT    Incision: clean, no signs of infection    Lab Results (last 24 hours)     Procedure Component Value Units Date/Time    Basic Metabolic Panel [[id number]]  (Abnormal) Collected: 04/15/22 0448    Specimen: Blood Updated: 04/15/22 0553     Glucose 105 mg/dL      BUN 16 mg/dL      Creatinine 0.66 mg/dL      Sodium 138 mmol/L      Potassium 4.1 mmol/L      Chloride 107 mmol/L      CO2 23.6 mmol/L      Calcium 8.4 mg/dL      BUN/Creatinine Ratio 24.2     Anion Gap 7.4 mmol/L      eGFR 95.7 mL/min/1.73      Comment: National Kidney Foundation and American Society of Nephrology (ASN) Task Force recommended calculation based on the Chronic Kidney Disease Epidemiology Collaboration (CKD-EPI) equation refit without adjustment for race.       Narrative:      GFR Normal >60  Chronic Kidney Disease <60  Kidney Failure <15      CBC (No Diff) [[id number]]  (Abnormal) Collected: 04/15/22 0448    Specimen: Blood Updated: 04/15/22 0536     WBC 11.34 10*3/mm3      RBC 3.29 10*6/mm3      Hemoglobin 10.4 g/dL      Hematocrit 31.2 %      MCV 94.8 fL      MCH 31.6 pg      MCHC 33.3 g/dL      RDW 11.6 %      RDW-SD 40.1 fl      MPV 8.6 fL      Platelets 291 10*3/mm3           XR Hip 1 View Without Pelvis Right (Surgery Only)    Result Date:  4/14/2022  Narrative: ONE VIEW PORTABLE RIGHT HIP  HISTORY: Hip replacement for osteoarthritis  FINDINGS: The patient has had recent total hip replacement and the alignment appears satisfactory.  This report was finalized on 4/14/2022 9:39 AM by Dr. Kenneth Kyle M.D.      Peripheral Block    Result Date: 4/14/2022  Narrative: Milton Hui MD     4/14/2022  6:01 AM Peripheral Block Pre-sedation assessment completed: 4/14/2022 6:00 AM Patient reassessed immediately prior to procedure Patient location during procedure: holding area Start time: 4/14/2022 6:00 AM Stop time: 4/14/2022 6:10 AM Reason for block: at surgeon's request and post-op pain management Performed by Anesthesiologist: Milton Hui MD Preanesthetic Checklist Completed: patient identified, IV checked, site marked, risks and benefits discussed, surgical consent, monitors and equipment checked, pre-op evaluation and timeout performed Prep: Pt Position: supine Sterile barriers:cap, gloves, gown, mask and sterile barriers Prep: ChloraPrep Patient monitoring: blood pressure monitoring, continuous pulse oximetry and EKG Procedure Sedation: yes Performed under: local infiltration Guidance:ultrasound guided ULTRASOUND INTERPRETATION. Using ultrasound guidance a 21 G gauge needle was placed in close proximity to the nerve, at which point, under ultrasound guidance anesthetic was injected in the area of the nerve and spread of the anesthesia was seen on ultrasound in close proximity thereto.  There were no abnormalities seen on ultrasound; a digital image was taken; and the patient tolerated the procedure with no complications. Images:still images obtained Laterality:right Block Type:fascia iliaca compartment Injection Technique:single-shot Needle Type:echogenic Needle Gauge:21 G Medications Used: Mepivacaine HCl (PF) (CARBOCAINE) 1.5 % injection, 15 mL ropivacaine (NAROPIN) 0.5 % injection, 20 mL Medications Comment:Ultrasound  Interpretation: Using ultrasound guidance, the needle was placed in close proximity to the target nerve and anesthetic was injected in the area of the target nerve and/or bundles, and spread of the anesthetic was seen on ultrasound in close proximity thereto.  There were no abnormalities seen on ultrasound; a digital / physical image was taken; and the patient tolerated the procedure with no complications. Block placed for postoperative pain control per surgeon request. Post Assessment Injection Assessment: negative aspiration for heme, no paresthesia on injection and incremental injection Patient Tolerance:comfortable throughout block Complications:no     XR Chest PA & Lateral, XR Hip With or Without Pelvis 2 - 3 View Right    Result Date: 3/31/2022  Narrative: TWO-VIEW RIGHT HIP AND ONE VIEW AP PELVIS  HISTORY: Hip pain. Preop for hip replacement.  FINDINGS: There are extremely severe degenerative changes involving the right hip more than left with marked joint space narrowing and subchondral degenerative sclerosis and cystic change involving the femoral head.  TWO-VIEW CHEST  HISTORY: Preop for hip surgery. Occasional cough.  FINDINGS: The lungs are well-expanded and clear and the heart and hilar structures appear normal. There is a very slight thoracic scoliosis convex to the right.  This report was finalized on 3/31/2022 2:09 PM by Dr. Kenneth Kyle M.D.      XR Hip With or Without Pelvis 1 View Right    Result Date: 4/14/2022  Narrative: RIGHT HIP OPERATIVE X-RAY  HISTORY: Right hip arthritis and pain.  A single intraoperative x-ray was provided for Dr. Covarrubias during right hip arthroplasty. The provided image shows an acetabular component in place as well as a femoral broach.      Impression: Operative imaging was provided for Dr. Covarrubias during right hip arthroplasty.  This report was finalized on 4/14/2022 9:06 AM by Dr. Mk Terrazas M.D.        Assesment/Plan:    Procedures:  Right TKA  Postoperative  Day: 1  Weightbearing Status:  WBAT with walker  DVT Prophylaxis:  ASA for DVT prophylaxis    Disposition:  Home with home health after PT today, if comfortable and mobilizing safely    Randal Melendez  [facility]  [address] [place] [phone]    4/15/2022

## 2022-04-15 NOTE — CASE MANAGEMENT/SOCIAL WORK
Case Management Discharge Note      Final Note: Home with OP PT.    Provided Post Acute Provider List?: N/A  N/A Provider List Comment: LOC CP: OP PT.    Selected Continued Care - Discharged on 4/15/2022 Admission date: 4/14/2022 - Discharge disposition: Home or Self Care    Destination    No services have been selected for the patient.              Durable Medical Equipment    No services have been selected for the patient.              Dialysis/Infusion    No services have been selected for the patient.              Home Medical Care    No services have been selected for the patient.              Therapy    No services have been selected for the patient.              Community Resources    No services have been selected for the patient.              Community & DME    No services have been selected for the patient.                       Final Discharge Disposition Code: 01 - home or self-care

## 2022-04-15 NOTE — THERAPY TREATMENT NOTE
Patient Name: Kaci Patrick  : 1955    MRN: [id number]                              Today's Date: 4/15/2022       Admit Date: 2022    Visit Dx:     ICD-10-CM ICD-9-CM   1. Primary osteoarthritis of right hip  M16.11 715.15     Patient Active Problem List   Diagnosis   • Screening for colon cancer   • Family history of colon cancer in father   • Primary osteoarthritis of right hip     Past Medical History:   Diagnosis Date   • Arthritis    • Asthma due to seasonal allergies    • Cervical dysplasia    • Insomnia    • Osteopenia    • PONV (postoperative nausea and vomiting)    • Right hip pain      Past Surgical History:   Procedure Laterality Date   • APPENDECTOMY     • CERVICAL CONE BIOPSY     • CERVICAL POLYPECTOMY     • COLONOSCOPY N/A 2020    Procedure: COLONOSCOPY TO CECUM;  Surgeon: Roseanne Cavanaugh MD;  Location: [facility] ENDOSCOPY;  Service: General;  Laterality: N/A;  PRE: SCREENING AND FAMILY H/O COLON CANCER  POST: POOR PREP   • DILATATION AND CURETTAGE     • SPINAL FUSION     • TOTAL HIP ARTHROPLASTY Right 2022    Procedure: RIGHT TOTAL HIP ARTHROPLASTY;  Surgeon: Randal Covarrubias MD;  Location: [facility] MAIN OR;  Service: Orthopedics;  Laterality: Right;      General Information     Row Name 04/15/22 0827          Physical Therapy Time and Intention    Document Type therapy note (daily note)  -     Mode of Treatment physical therapy  -     Row Name 04/15/22 0827          General Information    Existing Precautions/Restrictions fall;hip;hip, posterior;right  -     Row Name 04/15/22 0827          Safety Issues, Functional Mobility    Impairments Affecting Function (Mobility) balance;range of motion (ROM);pain;endurance/activity tolerance;strength  -     Comment, Safety Issues/Impairments (Mobility) gt belt and non skid socks for safety  -           User Key  (r) = Recorded By, (t) = Taken By, (c) = Cosigned By    Initials Name Provider Type    PH Laurie Nath  PTA Physical Therapist Assistant               Mobility     Row Name 04/15/22 0828          Bed Mobility    Bed Mobility supine-sit  -PH     Supine-Sit Braxton (Bed Mobility) standby assist  -PH     Assistive Device (Bed Mobility) bed rails;head of bed elevated  -PH     Comment, (Bed Mobility) extra time to EOB  -PH     Row Name 04/15/22 0828          Sit-Stand Transfer    Sit-Stand Braxton (Transfers) contact guard;verbal cues;supervision  -PH     Assistive Device (Sit-Stand Transfers) walker, front-wheeled  -PH     Comment, (Sit-Stand Transfer) STS x 2; from EOB / from toilet  -PH     Row Name 04/15/22 0828          Gait/Stairs (Locomotion)    Braxton Level (Gait) contact guard;verbal cues  -PH     Assistive Device (Gait) walker, front-wheeled  -PH     Distance in Feet (Gait) 12' to BR then 200'  -PH     Deviations/Abnormal Patterns (Gait) antalgic;debra decreased;gait speed decreased;stride length decreased  -PH     Bilateral Gait Deviations forward flexed posture;heel strike decreased  -PH     Right Sided Gait Deviations weight shift ability decreased  -PH     Braxton Level (Stairs) contact guard;verbal cues  -PH     Handrail Location (Stairs) right side (ascending);right side (descending)  -PH     Number of Steps (Stairs) 10  -PH     Ascending Technique (Stairs) step-to-step  -PH     Descending Technique (Stairs) step-to-step  -PH     Comment, (Gait/Stairs) very antalgic step to pattern w/ cues to incr strep length. pt very slow and antalgic w/ no LOB. Pt educ for asc/dsc steps w/ 1 HR. no overt safety issues noted.  -PH     Row Name 04/15/22 0828          Mobility    Extremity Weight-bearing Status right lower extremity  -PH     Right Lower Extremity (Weight-bearing Status) weight-bearing as tolerated (WBAT)  -PH           User Key  (r) = Recorded By, (t) = Taken By, (c) = Cosigned By    Initials Name Provider Type    Laurie Bianchi PTA Physical Therapist Assistant                Obj/Interventions     Row Name 04/15/22 0829          Motor Skills    Therapeutic Exercise other (see comments)  L DINO protocol x 15 reps  -PH           User Key  (r) = Recorded By, (t) = Taken By, (c) = Cosigned By    Initials Name Provider Type     Laurie Nath PTA Physical Therapist Assistant               Goals/Plan    No documentation.                Clinical Impression     Row Name 04/15/22 0830          Pain    Pretreatment Pain Rating 9/10  -PH     Posttreatment Pain Rating 10/10  -PH     Pain Location - Side/Orientation Right  -PH     Pain Location incisional  -PH     Pain Location - hip  -PH     Pre/Posttreatment Pain Comment RN to admin pain meds post session  -PH     Pain Intervention(s) Ambulation/increased activity;Repositioned  -PH     Additional Documentation Pain Scale: Numbers Pre/Post-Treatment (Group)  -PH     Row Name 04/15/22 0830          Plan of Care Review    Plan of Care Reviewed With patient  -PH     Progress improving  -PH     Outcome Evaluation Pt in bed at beg of PT session and sat up to EOB req SBA and extra time. Pt stood 2x req SV and use of fww. Pt amb 12' to BR then 200' req CGA and use of fww. Pt very slow and antalgic w/ vc to incr R step length. Pt edc for and asc/dsc 10 stairs w/ 1 HR. No overt safety issues noted.  Pt limited by pain and fatigue. Pt plans to return home w/ assist of spouse and OP PT.  -PH     Row Name 04/15/22 0830          Positioning and Restraints    Pre-Treatment Position in bed  -PH     Post Treatment Position chair  -PH     In Chair reclined;call light within reach;encouraged to call for assist;exit alarm on  -PH           User Key  (r) = Recorded By, (t) = Taken By, (c) = Cosigned By    Initials Name Provider Type     Laurie Nath PTA Physical Therapist Assistant               Outcome Measures    No documentation.                              Physical Therapy Education                 Title: PT OT SLP Therapies (Done)      Topic: Physical Therapy (Done)     Point: Mobility training (Done)     Learning Progress Summary           Patient Acceptance, E,D,H, VU by AR at 4/14/2022 1529   Family Acceptance, E,D,H, VU by AR at 4/14/2022 1529                   Point: Home exercise program (Done)     Learning Progress Summary           Patient Acceptance, E,D,H, VU by AR at 4/14/2022 1529   Family Acceptance, E,D,H, VU by AR at 4/14/2022 1529                   Point: Body mechanics (Done)     Learning Progress Summary           Patient Acceptance, E,D,H, VU by AR at 4/14/2022 1529   Family Acceptance, E,D,H, VU by AR at 4/14/2022 1529                   Point: Precautions (Done)     Learning Progress Summary           Patient Acceptance, E,D,H, VU by AR at 4/14/2022 1529   Family Acceptance, E,D,H, VU by AR at 4/14/2022 1529                               User Key     Initials Effective Dates Name Provider Type Discipline    AR 06/16/21 -  Sherly Bowens, PT Physical Therapist PT              PT Recommendation and Plan     Plan of Care Reviewed With: patient  Progress: improving  Outcome Evaluation: Pt in bed at beg of PT session and sat up to EOB req SBA and extra time. Pt stood 2x req SV and use of fww. Pt amb 12' to BR then 200' req CGA and use of fww. Pt very slow and antalgic w/ vc to incr R step length. Pt edc for and asc/dsc 10 stairs w/ 1 HR. No overt safety issues noted.  Pt limited by pain and fatigue. Pt plans to return home w/ assist of spouse and OP PT.     Time Calculation:    PT Charges     Row Name 04/15/22 0834             Time Calculation    Start Time 0812  -PH      Stop Time 0836  -PH      Time Calculation (min) 24 min  -PH      PT Received On 04/15/22  -PH      PT - Next Appointment 04/16/22  -PH              Timed Charges    78529 - PT Therapeutic Exercise Minutes 9  -PH      96667 - PT Therapeutic Activity Minutes 15  -PH              Total Minutes    Timed Charges Total Minutes 24  -PH       Total Minutes 24  -PH             User Key  (r) = Recorded By, (t) = Taken By, (c) = Cosigned By    Initials Name Provider Type     Laurie aNth PTA Physical Therapist Assistant              Therapy Charges for Today     Code Description Service Date Service Provider Modifiers Qty    55448744400  PT THER PROC EA 15 MIN 4/15/2022 Laurie Nath PTA GP 1    [id number]  PT THERAPEUTIC ACT EA 15 MIN 4/15/2022 Laurie Nath PTA GP 1          PT G-Codes  Outcome Measure Options: AM-PAC 6 Clicks Basic Mobility (PT)  AM-PAC 6 Clicks Score (PT): 18    Laurie Nath PTA  4/15/2022

## 2023-03-10 ENCOUNTER — OFFICE VISIT (OUTPATIENT)
Dept: OBSTETRICS AND GYNECOLOGY | Facility: CLINIC | Age: 68
End: 2023-03-10
Payer: COMMERCIAL

## 2023-03-10 VITALS
SYSTOLIC BLOOD PRESSURE: 129 MMHG | HEART RATE: 83 BPM | HEIGHT: 60 IN | BODY MASS INDEX: 21.99 KG/M2 | DIASTOLIC BLOOD PRESSURE: 80 MMHG | WEIGHT: 112 LBS

## 2023-03-10 DIAGNOSIS — Z12.11 COLON CANCER SCREENING: Primary | ICD-10-CM

## 2023-03-10 DIAGNOSIS — M85.852 OSTEOPENIA OF BOTH HIPS: ICD-10-CM

## 2023-03-10 DIAGNOSIS — Z01.419 VISIT FOR GYNECOLOGIC EXAMINATION: ICD-10-CM

## 2023-03-10 DIAGNOSIS — M85.851 OSTEOPENIA OF BOTH HIPS: ICD-10-CM

## 2023-03-10 PROCEDURE — 99397 PER PM REEVAL EST PAT 65+ YR: CPT | Performed by: OBSTETRICS & GYNECOLOGY

## 2023-03-10 PROCEDURE — 82274 ASSAY TEST FOR BLOOD FECAL: CPT | Performed by: OBSTETRICS & GYNECOLOGY

## 2023-03-10 RX ORDER — HYDROCODONE BITARTRATE AND ACETAMINOPHEN 7.5; 325 MG/1; MG/1
1 TABLET ORAL EVERY 6 HOURS PRN
COMMUNITY
Start: 2023-01-25

## 2023-03-10 RX ORDER — DOCUSATE SODIUM 100 MG/1
CAPSULE, LIQUID FILLED ORAL
COMMUNITY
Start: 2022-04-03

## 2023-03-10 RX ORDER — IBANDRONATE SODIUM 150 MG/1
150 TABLET, FILM COATED ORAL
Qty: 3 TABLET | Refills: 3 | Status: SHIPPED | OUTPATIENT
Start: 2023-03-10

## 2023-03-10 NOTE — PROGRESS NOTES
[facility]  [address], [address]  [place], [place] [address]  Phone: [phone] / Fax:  [phone]      03/10/2023    [address] [facility] [address]    Jarod Hair MD    Chief Complaint   Patient presents with   • Gynecologic Exam       Annual Exam, last pap 10-31-19 NL HPV (-), Mammogram - scattered fibroglandular densities throughout both breasts.  No suspicious mass, microcalcifications, or architectural distortion. Colonoscopy 7-2-20 NL.. Dexa 12-18-19 Osteopenia.       Kaci Patrick is here for annual gynecologic exam.  HPI - Patient with history of normal pap testing.  Last Mammogram was in 11/2022 and was normal.  Her last colonoscopy was in 2020 and was normal; a five year interval has been recommended to her.  She was diagnosed with Osteopenia in 2019 and is on Boniva; she had a right hip replacement within past year.    Past Medical History:   Diagnosis Date   • Arthritis    • Asthma due to seasonal allergies    • Cervical dysplasia    • Insomnia    • Osteopenia    • PONV (postoperative nausea and vomiting)    • Right hip pain     Resolved- Surgery 4/2022       Past Surgical History:   Procedure Laterality Date   • APPENDECTOMY     • CERVICAL CONE BIOPSY     • CERVICAL POLYPECTOMY     • COLONOSCOPY N/A 7/2/2020    Procedure: COLONOSCOPY TO CECUM;  Surgeon: Roseanne Cavanaugh MD;  Location: [facility] ENDOSCOPY;  Service: General;  Laterality: N/A;  PRE: SCREENING AND FAMILY H/O COLON CANCER  POST: POOR PREP   • DILATATION AND CURETTAGE     • SPINAL FUSION     • TOTAL HIP ARTHROPLASTY Right 4/14/2022    Procedure: RIGHT TOTAL HIP ARTHROPLASTY;  Surgeon: Randal Covarrubias MD;  Location: [facility] MAIN OR;  Service: Orthopedics;  Laterality: Right;       No Known Allergies    Social History     Socioeconomic History   • Marital status: Unknown   Tobacco Use   • Smoking status: Former     Packs/day: 1.00     Years: 25.00     Pack years: 25.00     Types: Cigarettes     Quit  "date: 10/29/2003     Years since quittin.3   • Smokeless tobacco: Never   Vaping Use   • Vaping Use: Never used   Substance and Sexual Activity   • Alcohol use: No   • Drug use: No   • Sexual activity: Yes     Partners: Male     Birth control/protection: Post-menopausal       Family History   Problem Relation Age of Onset   • Kidney disease Mother    • Colon cancer Father 87   • Prostate cancer Father 65   • Breast cancer Neg Hx    • Malig Hyperthermia Neg Hx        No LMP recorded (lmp unknown). Patient is postmenopausal.    OB History        3    Para   2    Term   2            AB   1    Living   2       SAB   1    IAB        Ectopic        Molar        Multiple        Live Births                    Vitals:    03/10/23 1125   BP: 129/80   Pulse: 83   Weight: 50.8 kg (112 lb)   Height: 152.4 cm (60\")       Physical Exam  Constitutional:       Appearance: Normal appearance. She is well-developed.   Genitourinary:      Bladder, rectum and urethral meatus normal.      Right Labia: No tenderness or lesions.     Left Labia: No tenderness or lesions.     No vaginal discharge or tenderness.        Right Adnexa: not tender and not full.     Left Adnexa: not tender and not full.     No cervical motion tenderness or lesion.      Uterus is not enlarged or tender.      No urethral tenderness or hypermobility present.   Rectum:      No rectal mass or tenderness.   Breasts:     Right: No mass or nipple discharge.      Left: No mass or nipple discharge.   HENT:      Right Ear: External ear normal.      Left Ear: External ear normal.      Nose: Nose normal.   Eyes:      Conjunctiva/sclera: Conjunctivae normal.   Neck:      Thyroid: No thyromegaly.   Cardiovascular:      Rate and Rhythm: Normal rate and regular rhythm.      Heart sounds: Normal heart sounds.   Pulmonary:      Effort: Pulmonary effort is normal.      Breath sounds: No stridor. No wheezing.   Abdominal:      Palpations: Abdomen is soft. There is no " mass.      Tenderness: There is no guarding or rebound.   Musculoskeletal:         General: Normal range of motion.      Cervical back: Normal range of motion and neck supple.   Neurological:      Mental Status: She is alert.      Coordination: Coordination normal.   Skin:     General: Skin is warm and dry.   Psychiatric:         Mood and Affect: Mood normal.         Behavior: Behavior normal.         Thought Content: Thought content normal.         Judgment: Judgment normal.   Vitals reviewed. Exam conducted with a chaperone present.         Diagnoses and all orders for this visit:    1. Colon cancer screening (Primary)  -     POC Occult Blood Stool  -     FOBT negative and screening up to date.    2. Visit for gynecologic examination        -     Discussed importance of regular screening and breast awareness.    3. Osteopenia of both hips  -     dexa bone density axial; Future  -     Continue Boniva, Calcium and Vitamin D.  Re-evaluate bone density with DEXA.        Guille Frazier MD

## 2023-09-29 ENCOUNTER — HOSPITAL ENCOUNTER (OUTPATIENT)
Dept: BONE DENSITY | Facility: HOSPITAL | Age: 68
Discharge: HOME | End: 2023-09-29
Admitting: OBSTETRICS & GYNECOLOGY
Payer: COMMERCIAL

## 2023-09-29 DIAGNOSIS — M85.851 OSTEOPENIA OF BOTH HIPS: ICD-10-CM

## 2023-09-29 DIAGNOSIS — M85.852 OSTEOPENIA OF BOTH HIPS: ICD-10-CM

## 2023-09-29 PROCEDURE — 77080 DXA BONE DENSITY AXIAL: CPT

## 2023-10-03 ENCOUNTER — TELEPHONE (OUTPATIENT)
Dept: OBSTETRICS AND GYNECOLOGY | Facility: CLINIC | Age: 68
End: 2023-10-03
Payer: COMMERCIAL

## 2023-10-03 NOTE — TELEPHONE ENCOUNTER
Left message regarding DEXA.  Patient on Boniva but has worsening bone loss.  Will likely need to consider Prolia.

## 2023-10-04 NOTE — TELEPHONE ENCOUNTER
Caitlin Clemons was on Boniva but has worsening DEXA.  Can you set her up for Prolia?    Thanks    Freddie

## 2023-10-04 NOTE — TELEPHONE ENCOUNTER
Patient returning your phone call from yesterday. Best call back number is [phone].    Wandy

## 2023-10-12 ENCOUNTER — TELEPHONE (OUTPATIENT)
Dept: OBSTETRICS AND GYNECOLOGY | Facility: CLINIC | Age: 68
End: 2023-10-12
Payer: COMMERCIAL

## 2023-10-12 NOTE — TELEPHONE ENCOUNTER
Spoke with pt about Prolia, Benefit verification shows that it may require a PA through pharmacy benefits, Pt aware that I will send the medication to [facility] and to expect a call from them within the week to set up delivery of medication to our office,  Once the medication is received, I will call her to schedule labs and injection appointment.fuad

## 2023-10-24 ENCOUNTER — TELEPHONE (OUTPATIENT)
Dept: OBSTETRICS AND GYNECOLOGY | Facility: CLINIC | Age: 68
End: 2023-10-24
Payer: COMMERCIAL

## 2023-10-24 DIAGNOSIS — M81.0 AGE-RELATED OSTEOPOROSIS WITHOUT CURRENT PATHOLOGICAL FRACTURE: Primary | ICD-10-CM

## 2023-10-25 ENCOUNTER — LAB (OUTPATIENT)
Dept: OBSTETRICS AND GYNECOLOGY | Facility: CLINIC | Age: 68
End: 2023-10-25
Payer: COMMERCIAL

## 2023-10-25 DIAGNOSIS — M81.0 AGE-RELATED OSTEOPOROSIS WITHOUT CURRENT PATHOLOGICAL FRACTURE: ICD-10-CM

## 2023-10-26 ENCOUNTER — TELEPHONE (OUTPATIENT)
Dept: OBSTETRICS AND GYNECOLOGY | Facility: CLINIC | Age: 68
End: 2023-10-26
Payer: COMMERCIAL

## 2023-10-26 LAB
25(OH)D3+25(OH)D2 SERPL-MCNC: 53.5 NG/ML (ref 30–100)
ALBUMIN SERPL-MCNC: 4.6 G/DL (ref 3.5–5.2)
ALBUMIN/GLOB SERPL: 2.3 G/DL
ALP SERPL-CCNC: 70 U/L (ref 39–117)
ALT SERPL-CCNC: 31 U/L (ref 1–33)
AST SERPL-CCNC: 29 U/L (ref 1–32)
BILIRUB SERPL-MCNC: 0.3 MG/DL (ref 0–1.2)
BUN SERPL-MCNC: 21 MG/DL (ref 8–23)
BUN/CREAT SERPL: 22.1 (ref 7–25)
CALCIUM SERPL-MCNC: 9.6 MG/DL (ref 8.6–10.5)
CHLORIDE SERPL-SCNC: 105 MMOL/L (ref 98–107)
CO2 SERPL-SCNC: 26.4 MMOL/L (ref 22–29)
CREAT SERPL-MCNC: 0.95 MG/DL (ref 0.57–1)
EGFRCR SERPLBLD CKD-EPI 2021: 65 ML/MIN/1.73
GLOBULIN SER CALC-MCNC: 2 GM/DL
GLUCOSE SERPL-MCNC: 119 MG/DL (ref 65–99)
POTASSIUM SERPL-SCNC: 4.3 MMOL/L (ref 3.5–5.2)
PROT SERPL-MCNC: 6.6 G/DL (ref 6–8.5)
SODIUM SERPL-SCNC: 140 MMOL/L (ref 136–145)

## 2023-10-27 ENCOUNTER — CLINICAL SUPPORT (OUTPATIENT)
Dept: OBSTETRICS AND GYNECOLOGY | Facility: CLINIC | Age: 68
End: 2023-10-27
Payer: COMMERCIAL

## 2023-10-27 VITALS
SYSTOLIC BLOOD PRESSURE: 133 MMHG | DIASTOLIC BLOOD PRESSURE: 78 MMHG | HEART RATE: 95 BPM | HEIGHT: 60 IN | WEIGHT: 109.6 LBS | BODY MASS INDEX: 21.52 KG/M2

## 2023-10-27 DIAGNOSIS — M81.0 AGE-RELATED OSTEOPOROSIS WITHOUT CURRENT PATHOLOGICAL FRACTURE: Primary | ICD-10-CM

## 2023-10-27 NOTE — PROGRESS NOTES
Patient here for Prolia 60 mg inj-given left upper arm sq. Patient tolerated well with no reaction after observation. Patient supply.  NDC 52415-682-66  LOT [id number]  EXP 12/31/25

## 2024-01-15 ENCOUNTER — TELEPHONE (OUTPATIENT)
Dept: URGENT CARE | Facility: CLINIC | Age: 69
End: 2024-01-15
Payer: COMMERCIAL

## 2024-01-15 NOTE — TELEPHONE ENCOUNTER
Left message on patient's mobile phone [phone].    Contacted alternative phone number of pt El (spouse)  at [phone].  Will relay message to patient.    [facility] on xray of distal fib Left ankle radiograph.

## 2024-01-22 PROBLEM — M48.061 LUMBAR CANAL STENOSIS: Status: ACTIVE | Noted: 2024-01-22

## 2024-01-22 PROBLEM — M54.16 LUMBAR RADICULOPATHY: Status: ACTIVE | Noted: 2022-09-30

## 2024-01-22 PROBLEM — M96.1 LUMBAR POST-LAMINECTOMY SYNDROME: Status: ACTIVE | Noted: 2022-09-30

## 2024-01-22 PROBLEM — M43.00 ACQUIRED SPONDYLOLYSIS: Status: ACTIVE | Noted: 2024-01-22

## 2024-01-22 PROBLEM — M62.81 DECREASED MOTOR STRENGTH: Status: ACTIVE | Noted: 2024-01-22

## 2024-01-22 PROBLEM — M53.3 SACROILIAC JOINT PAIN: Status: ACTIVE | Noted: 2022-09-30

## 2024-02-23 NOTE — H&P (VIEW-ONLY)
Subjective   Patient ID: Kaci Patrick is a 68 y.o. female is being seen for consultation today at the request of Jarod Hair MD for radiculopathy, lumbar region. Patient had MRI L-spine done on 11/17/2023 @ [facility]    Treatment: No recent treatment     Today patient states that she has low back pain that radiates into the L leg/foot, along with N/T in B/L legs    History of Present Illness    This patient has been having pain in her back but mostly into her left leg.  This problem has been going on for several years and she had been doing epidural blocks and physical therapy but they were failing to help any longer.  It got really severe last fall with pain primarily in her left calf and into her left foot.  She has some pain in her back and in the upper part of her legs but not nearly as bad as the pain in her lower leg and foot.  She did fracture her ankle on the left side more recently that the pain was already there prior to that.  The right leg is okay.  She has no difficulty with bowel bladder control.  She did have surgery at L4-5 about 20 years ago by the [facility].    The following portions of the patient's history were reviewed and updated as appropriate: allergies, current medications, past family history, past medical history, past social history, past surgical history, and problem list.    Review of Systems   Constitutional:  Negative for chills and fever.   HENT:  Negative for congestion.    Genitourinary:  Negative for difficulty urinating and dysuria.   Musculoskeletal:  Positive for back pain, gait problem and myalgias.   Neurological:  Positive for weakness and numbness.       I reviewed the review of systems listed by the patient and discussed by my MA    Objective     There were no vitals filed for this visit.  There is no height or weight on file to calculate BMI.    Tobacco Use: Medium Risk (2/27/2024)    Patient History     Smoking Tobacco Use: Former      Smokeless Tobacco Use: Never     Passive Exposure: Not on file          Physical Exam  Constitutional:       Appearance: She is well-developed.   HENT:      Head: Normocephalic and atraumatic.   Eyes:      Extraocular Movements: EOM normal.      Conjunctiva/sclera: Conjunctivae normal.      Pupils: Pupils are equal, round, and reactive to light.   Neck:      Vascular: No carotid bruit.   Neurological:      Mental Status: She is oriented to person, place, and time.      Coordination: Finger-Nose-Finger Test and Heel to Shin Test normal.      Gait: Gait is intact.      Deep Tendon Reflexes:      Reflex Scores:       Tricep reflexes are 2+ on the right side and 2+ on the left side.       Bicep reflexes are 2+ on the right side and 2+ on the left side.       Brachioradialis reflexes are 2+ on the right side and 2+ on the left side.       Patellar reflexes are 2+ on the right side and 2+ on the left side.       Achilles reflexes are 2+ on the right side and 2+ on the left side.  Psychiatric:         Speech: Speech normal.       Neurologic Exam     Mental Status   Oriented to person, place, and time.   Registration of memory: Good recent and remote memory.   Attention: normal. Concentration: normal.   Speech: speech is normal   Level of consciousness: alert  Knowledge: consistent with education.     Cranial Nerves     CN II   Visual fields full to confrontation.   Visual acuity: normal    CN III, IV, VI   Pupils are equal, round, and reactive to light.  Extraocular motions are normal.     CN V   Facial sensation intact.   Right corneal reflex: normal  Left corneal reflex: normal    CN VII   Facial expression full, symmetric.   Right facial weakness: none  Left facial weakness: none    CN VIII   Hearing: intact    CN IX, X   Palate: symmetric    CN XI   Right sternocleidomastoid strength: normal  Left sternocleidomastoid strength: normal    CN XII   Tongue: not atrophic  Tongue deviation: none    Motor Exam   Muscle bulk:  normal  Right arm tone: normal  Left arm tone: normal  Right leg tone: normal  Left leg tone: normal    Strength   Strength 5/5 except as noted.     Sensory Exam   Light touch normal.     Gait, Coordination, and Reflexes     Gait  Gait: normal    Coordination   Finger to nose coordination: normal  Heel to shin coordination: normal    Reflexes   Right brachioradialis: 2+  Left brachioradialis: 2+  Right biceps: 2+  Left biceps: 2+  Right triceps: 2+  Left triceps: 2+  Right patellar: 2+  Left patellar: 2+  Right achilles: 2+  Left achilles: 2+  Right : 2+  Left : 2+          Assessment & Plan   Independent Review of Radiographic Studies:      I personally reviewed the images from the following studies.    I reviewed an MRI of the lumbar spine done on 17 November of last year.  This shows some lateral recess stenosis on the right side at T12-L1.  L1 to also shows some right-sided lateral recess stenosis.  L2-3 shows bilateral lateral recess stenosis.  L3-4 shows fairly severe left lateral recess stenosis.  L4-5 is previously been fused and is fairly open.  L5-S1 shows a grade 1 spondylolisthesis with severe spinal stenosis.  I think we could do an LP at L2-3.  She does have severe lumbar scoliosis    Medical Decision Making:      I told the patient about the imaging.  I recommended we do a lumbar myelogram.  I told the patient what a myelogram involves.  I explained that there is a 50% chance of developing a bad headache and nausea as a result of the test.  I explained that there is also a very small chance of infection, seizures, and bleeding.  I explained how we would treat a post myelogram headache including bedrest, caffeinated fluids, steroids, and blood patch.  The patient does ask to proceed.    Diagnoses and all orders for this visit:    1. Spinal stenosis of lumbar region with neurogenic claudication (Primary)  -     Obtain Informed Consent; Standing  -     IR Myelogram Lumbar Spine; Future  -     CT  Lumbar Spine With Intrathecal Contrast; Future  -     XR Spine Lumbar Complete With Flex & Ext; Future  -     No Lab Testing Needed; Standing  -     dexAMETHasone (DECADRON) 4 MG tablet; Take 2 tablets by mouth Take As Directed. Take both tablets by mouth 2 hours before myelogram  Dispense: 2 tablet; Refill: 0      Return for After radiology test.

## 2024-02-23 NOTE — PROGRESS NOTES
Subjective   Patient ID: Kaci Patrick is a 68 y.o. female is being seen for consultation today at the request of Jarod Hair MD for radiculopathy, lumbar region. Patient had MRI L-spine done on 11/17/2023 @ [facility]    Treatment: No recent treatment     Today patient states that she has low back pain that radiates into the L leg/foot, along with N/T in B/L legs    History of Present Illness    This patient has been having pain in her back but mostly into her left leg.  This problem has been going on for several years and she had been doing epidural blocks and physical therapy but they were failing to help any longer.  It got really severe last fall with pain primarily in her left calf and into her left foot.  She has some pain in her back and in the upper part of her legs but not nearly as bad as the pain in her lower leg and foot.  She did fracture her ankle on the left side more recently that the pain was already there prior to that.  The right leg is okay.  She has no difficulty with bowel bladder control.  She did have surgery at L4-5 about 20 years ago by the [facility].    The following portions of the patient's history were reviewed and updated as appropriate: allergies, current medications, past family history, past medical history, past social history, past surgical history, and problem list.    Review of Systems   Constitutional:  Negative for chills and fever.   HENT:  Negative for congestion.    Genitourinary:  Negative for difficulty urinating and dysuria.   Musculoskeletal:  Positive for back pain, gait problem and myalgias.   Neurological:  Positive for weakness and numbness.       I reviewed the review of systems listed by the patient and discussed by my MA    Objective     There were no vitals filed for this visit.  There is no height or weight on file to calculate BMI.    Tobacco Use: Medium Risk (2/27/2024)    Patient History     Smoking Tobacco Use: Former      Smokeless Tobacco Use: Never     Passive Exposure: Not on file          Physical Exam  Constitutional:       Appearance: She is well-developed.   HENT:      Head: Normocephalic and atraumatic.   Eyes:      Extraocular Movements: EOM normal.      Conjunctiva/sclera: Conjunctivae normal.      Pupils: Pupils are equal, round, and reactive to light.   Neck:      Vascular: No carotid bruit.   Neurological:      Mental Status: She is oriented to person, place, and time.      Coordination: Finger-Nose-Finger Test and Heel to Shin Test normal.      Gait: Gait is intact.      Deep Tendon Reflexes:      Reflex Scores:       Tricep reflexes are 2+ on the right side and 2+ on the left side.       Bicep reflexes are 2+ on the right side and 2+ on the left side.       Brachioradialis reflexes are 2+ on the right side and 2+ on the left side.       Patellar reflexes are 2+ on the right side and 2+ on the left side.       Achilles reflexes are 2+ on the right side and 2+ on the left side.  Psychiatric:         Speech: Speech normal.       Neurologic Exam     Mental Status   Oriented to person, place, and time.   Registration of memory: Good recent and remote memory.   Attention: normal. Concentration: normal.   Speech: speech is normal   Level of consciousness: alert  Knowledge: consistent with education.     Cranial Nerves     CN II   Visual fields full to confrontation.   Visual acuity: normal    CN III, IV, VI   Pupils are equal, round, and reactive to light.  Extraocular motions are normal.     CN V   Facial sensation intact.   Right corneal reflex: normal  Left corneal reflex: normal    CN VII   Facial expression full, symmetric.   Right facial weakness: none  Left facial weakness: none    CN VIII   Hearing: intact    CN IX, X   Palate: symmetric    CN XI   Right sternocleidomastoid strength: normal  Left sternocleidomastoid strength: normal    CN XII   Tongue: not atrophic  Tongue deviation: none    Motor Exam   Muscle bulk:  normal  Right arm tone: normal  Left arm tone: normal  Right leg tone: normal  Left leg tone: normal    Strength   Strength 5/5 except as noted.     Sensory Exam   Light touch normal.     Gait, Coordination, and Reflexes     Gait  Gait: normal    Coordination   Finger to nose coordination: normal  Heel to shin coordination: normal    Reflexes   Right brachioradialis: 2+  Left brachioradialis: 2+  Right biceps: 2+  Left biceps: 2+  Right triceps: 2+  Left triceps: 2+  Right patellar: 2+  Left patellar: 2+  Right achilles: 2+  Left achilles: 2+  Right : 2+  Left : 2+          Assessment & Plan   Independent Review of Radiographic Studies:      I personally reviewed the images from the following studies.    I reviewed an MRI of the lumbar spine done on 17 November of last year.  This shows some lateral recess stenosis on the right side at T12-L1.  L1 to also shows some right-sided lateral recess stenosis.  L2-3 shows bilateral lateral recess stenosis.  L3-4 shows fairly severe left lateral recess stenosis.  L4-5 is previously been fused and is fairly open.  L5-S1 shows a grade 1 spondylolisthesis with severe spinal stenosis.  I think we could do an LP at L2-3.  She does have severe lumbar scoliosis    Medical Decision Making:      I told the patient about the imaging.  I recommended we do a lumbar myelogram.  I told the patient what a myelogram involves.  I explained that there is a 50% chance of developing a bad headache and nausea as a result of the test.  I explained that there is also a very small chance of infection, seizures, and bleeding.  I explained how we would treat a post myelogram headache including bedrest, caffeinated fluids, steroids, and blood patch.  The patient does ask to proceed.    Diagnoses and all orders for this visit:    1. Spinal stenosis of lumbar region with neurogenic claudication (Primary)  -     Obtain Informed Consent; Standing  -     IR Myelogram Lumbar Spine; Future  -     CT  Lumbar Spine With Intrathecal Contrast; Future  -     XR Spine Lumbar Complete With Flex & Ext; Future  -     No Lab Testing Needed; Standing  -     dexAMETHasone (DECADRON) 4 MG tablet; Take 2 tablets by mouth Take As Directed. Take both tablets by mouth 2 hours before myelogram  Dispense: 2 tablet; Refill: 0      Return for After radiology test.

## 2024-02-27 ENCOUNTER — PATIENT ROUNDING (BHMG ONLY) (OUTPATIENT)
Dept: NEUROSURGERY | Facility: CLINIC | Age: 69
End: 2024-02-27
Payer: COMMERCIAL

## 2024-02-27 ENCOUNTER — OFFICE VISIT (OUTPATIENT)
Dept: NEUROSURGERY | Facility: CLINIC | Age: 69
End: 2024-02-27
Payer: COMMERCIAL

## 2024-02-27 DIAGNOSIS — M48.062 SPINAL STENOSIS OF LUMBAR REGION WITH NEUROGENIC CLAUDICATION: Primary | ICD-10-CM

## 2024-02-27 PROCEDURE — 99204 OFFICE O/P NEW MOD 45 MIN: CPT | Performed by: NEUROLOGICAL SURGERY

## 2024-02-27 RX ORDER — DEXAMETHASONE 4 MG/1
8 TABLET ORAL TAKE AS DIRECTED
Qty: 2 TABLET | Refills: 0 | Status: SHIPPED | OUTPATIENT
Start: 2024-02-27

## 2024-03-04 NOTE — PROGRESS NOTES
Subjective   Patient ID: Kaci Patrick is a 68 y.o. female is here today for follow-up with a new Lumbar Myelogram done on 3/5/2024.    Today patient's symptoms are unchanged     History of Present Illness    This patient was last here in late February.  She was continuing with pain in her back with radiation primarily into her left leg.  Is mostly in her left calf and her left foot.  She did have previous surgery at L4-5 by the [facility].    The following portions of the patient's history were reviewed and updated as appropriate: allergies, current medications, past family history, past medical history, past social history, past surgical history, and problem list.    Review of Systems   Constitutional:  Negative for chills and fever.   HENT:  Negative for congestion.    Genitourinary:  Negative for difficulty urinating and dysuria.   Musculoskeletal:  Positive for back pain, gait problem and myalgias.   Neurological:  Positive for weakness and numbness.       I reviewed the review of systems listed by the patient and discussed by my MA    Objective     There were no vitals filed for this visit.  There is no height or weight on file to calculate BMI.    Tobacco Use: Medium Risk (3/12/2024)    Patient History     Smoking Tobacco Use: Former     Smokeless Tobacco Use: Never     Passive Exposure: Not on file          Physical Exam  Neurological:      Mental Status: She is alert and oriented to person, place, and time.       Neurologic Exam     Mental Status   Oriented to person, place, and time.           Assessment & Plan   Independent Review of Radiographic Studies:      I personally reviewed the images from the following studies.    I reviewed her plain films, myelogram, and CT scan myself.  Plain films show the previous surgery at L4-5.  I think there is a solid fusion there with good bone growth.  There is an intradiscal cage as well.  On the myelogram itself there does appear to be pretty severe  stenosis at L3-4 and even some at L1-2 and L2-3 particularly on the right.  The left side is more open.  L3-4 is stenotic on both sides.  On the post myelographic CT scan the lower thoracic spine mostly looks okay with just a little disc bulging but no severe pressure on the neural elements.  Everything is pretty clear all the way down to L1.  At L1-2 there is severe right lateral recess stenosis.  L2-3 shows pretty severe stenosis although she has a severe lumbar scoliosis which makes it very difficult to interpret these images.  L3-4 shows no contrast at all.  L4-5 is the operated level and is fairly open.  L5-S1 shows something in the left lateral recess.  On reviewing the myelogram there does appear to be pretty severe narrowing at L5-S1 even on the standing films.    Medical Decision Making:      I told the patient that I think she will need surgery at L3-4 and L5-S1.  It may be necessary to do L1-2 and L2-3 as well but with the severe lumbar scoliosis it might be better to leave those alone for the moment.  I had a long discussion with the patient about the situation.  Explained that is a very complex situation.  The surgery is certainly a more complex surgery than I would normally do and so I would like to get her into see one of my partners who does more complex spine surgery.  She is in agreement with that plan and I have discussed her case with Dr. Young.    Diagnoses and all orders for this visit:    1. Spinal stenosis of lumbar region with neurogenic claudication (Primary)  -     Ambulatory Referral to Neurosurgery      Return if symptoms worsen or fail to improve.

## 2024-03-04 NOTE — PROGRESS NOTES
03/05/24 0001   Pre-Procedure Phone Call   Procedure Time Verified Yes   Arrival Time 0600   Procedure Location Verified Yes   Medical History Reviewed No   NPO Status Reinforced Yes   Ride and Caregiver Arranged Yes   Patient Knows to Bring Current Medications No   Bring Outside Films Requested No

## 2024-03-05 ENCOUNTER — HOSPITAL ENCOUNTER (OUTPATIENT)
Dept: GENERAL RADIOLOGY | Facility: HOSPITAL | Age: 69
Discharge: HOME | End: 2024-03-05
Payer: COMMERCIAL

## 2024-03-05 ENCOUNTER — HOSPITAL ENCOUNTER (OUTPATIENT)
Dept: CT IMAGING | Facility: HOSPITAL | Age: 69
Discharge: HOME | End: 2024-03-05
Payer: COMMERCIAL

## 2024-03-05 VITALS
HEART RATE: 88 BPM | BODY MASS INDEX: 21.6 KG/M2 | WEIGHT: 110 LBS | SYSTOLIC BLOOD PRESSURE: 129 MMHG | DIASTOLIC BLOOD PRESSURE: 82 MMHG | OXYGEN SATURATION: 96 % | HEIGHT: 60 IN | TEMPERATURE: 97.5 F | RESPIRATION RATE: 18 BRPM

## 2024-03-05 DIAGNOSIS — M48.062 SPINAL STENOSIS OF LUMBAR REGION WITH NEUROGENIC CLAUDICATION: ICD-10-CM

## 2024-03-05 PROCEDURE — 62304 MYELOGRAPHY LUMBAR INJECTION: CPT

## 2024-03-05 PROCEDURE — 25510000001 IOPAMIDOL 41 % SOLUTION: Performed by: NEUROLOGICAL SURGERY

## 2024-03-05 PROCEDURE — 72114 X-RAY EXAM L-S SPINE BENDING: CPT

## 2024-03-05 PROCEDURE — 72132 CT LUMBAR SPINE W/DYE: CPT

## 2024-03-05 PROCEDURE — 72240 MYELOGRAPHY NECK SPINE: CPT

## 2024-03-05 PROCEDURE — 25010000002 LIDOCAINE 1 % SOLUTION: Performed by: NEUROLOGICAL SURGERY

## 2024-03-05 PROCEDURE — 62284 INJECTION FOR MYELOGRAM: CPT | Performed by: NEUROLOGICAL SURGERY

## 2024-03-05 RX ORDER — IOPAMIDOL 408 MG/ML
20 INJECTION, SOLUTION INTRATHECAL
Status: COMPLETED | OUTPATIENT
Start: 2024-03-05 | End: 2024-03-05

## 2024-03-05 RX ORDER — LIDOCAINE HYDROCHLORIDE 10 MG/ML
10 INJECTION, SOLUTION INFILTRATION; PERINEURAL ONCE
Status: COMPLETED | OUTPATIENT
Start: 2024-03-05 | End: 2024-03-05

## 2024-03-05 RX ORDER — ACETAMINOPHEN 325 MG/1
650 TABLET ORAL EVERY 4 HOURS PRN
Status: DISCONTINUED | OUTPATIENT
Start: 2024-03-05 | End: 2024-03-06 | Stop reason: HOSPADM

## 2024-03-05 RX ORDER — HYDROCODONE BITARTRATE AND ACETAMINOPHEN 5; 325 MG/1; MG/1
1 TABLET ORAL EVERY 4 HOURS PRN
Status: DISCONTINUED | OUTPATIENT
Start: 2024-03-05 | End: 2024-03-06 | Stop reason: HOSPADM

## 2024-03-05 RX ADMIN — IOPAMIDOL 20 ML: 408 INJECTION, SOLUTION INTRATHECAL at 07:07

## 2024-03-05 RX ADMIN — HYDROCODONE BITARTRATE AND ACETAMINOPHEN 1 TABLET: 5; 325 TABLET ORAL at 09:33

## 2024-03-05 RX ADMIN — LIDOCAINE HYDROCHLORIDE 4 ML: 10 INJECTION, SOLUTION INFILTRATION; PERINEURAL at 07:04

## 2024-03-05 NOTE — DISCHARGE INSTRUCTIONS
EDUCATION /DISCHARGE INSTRUCTIONS:    A myelogram is a special radiology procedure of the spinal cord, spinal nerves and other related structures.  You will be awake during the examination.  An area of your lower back will be cleansed with an antiseptic solution.  The physician will inject a numbing medication in your lower back.  While your back is numb, a needle will be placed in the lower back area.  A small amount of spinal fluid may be withdrawn and sent to the lab if ordered by your physician. While the needle is in the back, an injection of a contrast material (xray dye) will be given through the needle.  The contrast material will allow the physician to see the spinal cord and spinal nerves.  Once injected, the needle will be removed and a band aid will be placed over the injection site.  The table will be tilted during the process to allow the contrast material to flow to particular areas in the spine.  Following the injection and xrays, you will be taken to the CT scanner where more pictures will be taken. After the procedure is finished, the contrast material will be absorbed by your body and eliminated through your kidneys.  The radiologist will study and interpret your myelogram and send the results to your physician.  Procedure risks of a myelogram include, but are not limited to:  *  Bleeding   *  Seizure  *  Infection   *  Headache, possibly severe requiring a blood patch  *  Contrast reaction  *  Nerve or cord injury  *  Paralysis and death  Benefits of the procedure:  *  Best examination for delineating pathology related to spinal cord compression from a disc and/or nerve root compression  Alternatives to the procedure:  MRI - a non invasive procedure requiring intravenous contrast injection.  Cannot be done on patients with certain pacemakers or metal in the body.  MRI risks include possible reaction to the contrast material, movement of metal located in the body.Benefit to MRI:  Non-invasive and  usually painless procedure.  THIS EDUCATION INFORMATION WAS REVIEWED PRIOR TO PROCEDURE AND CONSENT.   Patient initials______BP__________Time______0630____________    24 hour rest period ends _________10:00 am Wednesday 3/6/24___________.  Important information following your myelogram:  * ACTIVITY:   *  You may sit up in the car to go home.  *  When you get home, remain on bed rest (flat on your back or on your side) for 24 hours. You may place a rolled up towel under your neck for support  * You may get up to the bathroom and sit up to eat and drink then lie back down  * Drink additional fluids for 24 hours after the myelogram.   * Continue to drink additional fluids for the next 2-3 days. Water and caffeinated beverages are encouraged.  * Remain less active for the next two to three days.  * Do not drive for 24 hours following a myelogram.  * You may remove the bandage and shower in the morning.    CALL YOUR PHYSICIAN FOR THE FOLLOWING:  * Pain at the injection site  * Redness, swelling, bruising or drainage at the injection site.  * A fever by mouth of 101.0 or any new symptoms  Headaches are a common side effect after a myelogram.  If you get a headache, you should stay flat in bed and drink plenty of fluids. If the headache persists and does not go away with rest/medication,   CALL Dr. Ojeda at [phone]

## 2024-03-05 NOTE — NURSING NOTE
Patient dressed, walking boot on, to wheelchair, taken to patient discharge by KAITLYN Harris.  is driving her home now.

## 2024-03-05 NOTE — POST-PROCEDURE NOTE
Preop diagnosis:  Lumbar radiculopathy  Postop diagnosis:  same  LP at L23  15 cc of 200M Isovue  Complications: none  EBL: 0 cc  Specimens: none

## 2024-03-06 ENCOUNTER — TELEPHONE (OUTPATIENT)
Dept: INTERVENTIONAL RADIOLOGY/VASCULAR | Facility: HOSPITAL | Age: 69
End: 2024-03-06
Payer: COMMERCIAL

## 2024-03-12 ENCOUNTER — OFFICE VISIT (OUTPATIENT)
Dept: NEUROSURGERY | Facility: CLINIC | Age: 69
End: 2024-03-12
Payer: COMMERCIAL

## 2024-03-12 DIAGNOSIS — M48.062 SPINAL STENOSIS OF LUMBAR REGION WITH NEUROGENIC CLAUDICATION: Primary | ICD-10-CM

## 2024-03-12 PROCEDURE — 99214 OFFICE O/P EST MOD 30 MIN: CPT | Performed by: NEUROLOGICAL SURGERY

## 2024-03-19 NOTE — PROGRESS NOTES
Patient ID: Kaci Patrick is a 68 y.o. female is here today for follow-up for spinal stenosis.    Imaging: XR of the lumbar spine performed on 03/05/2024, CT of the lumbar spine performed on 03/05/2024 and IR myelogram lumbar spine performed on 03/05/2024    Subjective     The patient is here in regards to   Chief Complaint   Patient presents with    Back Pain    Follow-up       History of Present Illness  Kaci complains of low back pain that is on and off in her lateral hip areas it is worse on the left than the right.  She is currently wearing a walking boot on her left ankle.  She has had previous L4-5 fusion in the past.  She complains of pain in her low back that radiates to her buttocks and her hamstrings but not in any anterior distribution or lateral distribution in her legs.  She has previously had SI joint injections as well as epidurals and both have been helpful in the past but they seem to be less effective over time.      While in the room and during my examination of the patient I wore a mask and eye protection.  I washed my hands before and after this patient encounter.  The patient was also wearing a mask.    The following portions of the patient's history were reviewed and updated as appropriate: allergies, current medications, past family history, past medical history, past social history, past surgical history and problem list.    Review of Systems   Constitutional:  Negative for fever.   Musculoskeletal:  Positive for back pain, gait problem and neck stiffness. Negative for neck pain.   Neurological:  Positive for weakness and numbness (left foot and leg). Negative for dizziness, light-headedness and headaches.        Past Medical History:   Diagnosis Date    Arthritis     Asthma due to seasonal allergies     Broken ankle 01/2024    left    Cervical dysplasia     Insomnia     Osteopenia     PONV (postoperative nausea and vomiting)     Right hip pain     Resolved- Surgery 4/2022       No  Known Allergies    Family History   Problem Relation Age of Onset    Kidney disease Mother     Colon cancer Father 87    Prostate cancer Father 65    Breast cancer Neg Hx     Malig Hyperthermia Neg Hx        Social History     Socioeconomic History    Marital status: Unknown   Tobacco Use    Smoking status: Former     Current packs/day: 0.00     Average packs/day: 1 pack/day for 25.0 years (25.0 ttl pk-yrs)     Types: Cigarettes     Start date: 10/29/1978     Quit date: 10/29/2003     Years since quittin.4    Smokeless tobacco: Never   Vaping Use    Vaping status: Never Used   Substance and Sexual Activity    Alcohol use: No    Drug use: No    Sexual activity: Yes     Partners: Male     Birth control/protection: Post-menopausal       Past Surgical History:   Procedure Laterality Date    APPENDECTOMY      CERVICAL CONE BIOPSY      CERVICAL POLYPECTOMY      COLONOSCOPY N/A 2020    Procedure: COLONOSCOPY TO CECUM;  Surgeon: Roseanne Cavanaugh MD;  Location: [facility] ENDOSCOPY;  Service: General;  Laterality: N/A;  PRE: SCREENING AND FAMILY H/O COLON CANCER  POST: POOR PREP    DILATATION AND CURETTAGE      SPINAL FUSION      TOTAL HIP ARTHROPLASTY Right 2022    Procedure: RIGHT TOTAL HIP ARTHROPLASTY;  Surgeon: Randal Covarrubias MD;  Location: [facility] MAIN OR;  Service: Orthopedics;  Laterality: Right;         Objective     Vitals:    24 1258   BP: 128/76   Pulse: 114   Resp: 16   SpO2: 97%     Body mass index is 21.48 kg/m².    Physical Exam  Constitutional:       Appearance: Normal appearance.   HENT:      Head: Normocephalic and atraumatic.   Eyes:      Extraocular Movements: Extraocular movements intact.      Conjunctiva/sclera: Conjunctivae normal.      Pupils: Pupils are equal, round, and reactive to light.   Cardiovascular:      Rate and Rhythm: Normal rate and regular rhythm.      Pulses: Normal pulses.   Pulmonary:      Breath sounds: Normal breath sounds.   Abdominal:      Palpations: Abdomen  is soft.   Musculoskeletal:         General: Normal range of motion.      Cervical back: Normal range of motion and neck supple.      Comments: Bilateral SI joint inflammation characterized by:    -Pelvic Distraction test   -Lateral Iliac compression test   -FABERS (Phi's test)   -Ganslen's Test     Skin:     General: Skin is warm and dry.   Neurological:      Mental Status: She is alert and oriented to person, place, and time.      Motor: Motor function is intact. No weakness or atrophy.      Coordination: Coordination is intact. Romberg sign negative.      Gait: Gait is intact. Gait normal.      Deep Tendon Reflexes: Reflexes are normal and symmetric.      Reflex Scores:       Tricep reflexes are 2+ on the right side and 2+ on the left side.       Bicep reflexes are 2+ on the right side and 2+ on the left side.       Brachioradialis reflexes are 2+ on the right side and 2+ on the left side.       Patellar reflexes are 2+ on the right side and 2+ on the left side.       Achilles reflexes are 2+ on the right side and 2+ on the left side.        Neurologic Exam     Mental Status   Oriented to person, place, and time.     Cranial Nerves     CN III, IV, VI   Pupils are equal, round, and reactive to light.    Gait, Coordination, and Reflexes     Gait  Gait: normal    Reflexes   Right brachioradialis: 2+  Left brachioradialis: 2+  Right biceps: 2+  Left biceps: 2+  Right triceps: 2+  Left triceps: 2+  Right patellar: 2+  Left patellar: 2+  Right achilles: 2+  Left achilles: 2+      Assessment & Plan   Independent Review of Radiographic Studies:      I personally reviewed the images from the following studies.    MR: MRI of the lumbar spine wo contrast was reviewed and shows extensive degenerative disc disease at multiple levels with severe levoscoliosis centered around L2.  There is also previous fusion at L4-5.  There is spondylolisthesis at L5-S1 resulting in severe foraminal stenosis  bilaterally.    Assessment/Plan: She could possibly have lumbar radiculopathy contributing to foot drop but it is also possible that most of her symptoms are from SI joint inflammation.  I think it is in her best interest to try conservative manage for her SI joint inflammation first which includes anti-inflammatory therapy and ice baths.  Afterwards, if she still continues to have symptoms then she may require intervention for her lumbar spine would likely involve a deformity surgery.    Medical Decision Making:      SI joint injections, follow-up in 3 months         Diagnoses and all orders for this visit:    1. SI (sacroiliac) joint inflammation (Primary)  -     SI Joint Injection    2. Spondylolisthesis of lumbar region    3. Lumbar radiculopathy             Patient Instructions/Recommendations:    After your SI joint injection:  -Please schedule your second SI joint injection at the time of your first SI joint injection for 2 weeks later  -Alternate 1 tablet of extra strength Tylenol and 2 tablets of Aleve (or any other NSAID) in a scheduled manner every 4 hours for at least 2 weeks after the injection    -Take an ice bath by submerging the buttocks area in 50 degree water for 30 minutes/day for 5 days consecutively after your injection.   -Try adding ice gradually to decrease the shock of the ice bath   -Try putting a robe in the dryer for 35 minutes so that the patient can have a warm robe to wear after the ice bath    -Continue to use ice packs as much as tolerable when you are not taking an ice bath        Christopher Young MD  03/20/24  13:21 EDT

## 2024-03-20 ENCOUNTER — TELEPHONE (OUTPATIENT)
Dept: NEUROSURGERY | Facility: CLINIC | Age: 69
End: 2024-03-20

## 2024-03-20 ENCOUNTER — OFFICE VISIT (OUTPATIENT)
Dept: NEUROSURGERY | Facility: CLINIC | Age: 69
End: 2024-03-20
Payer: COMMERCIAL

## 2024-03-20 VITALS
WEIGHT: 110 LBS | RESPIRATION RATE: 16 BRPM | BODY MASS INDEX: 21.6 KG/M2 | OXYGEN SATURATION: 97 % | DIASTOLIC BLOOD PRESSURE: 76 MMHG | HEIGHT: 60 IN | HEART RATE: 114 BPM | SYSTOLIC BLOOD PRESSURE: 128 MMHG

## 2024-03-20 DIAGNOSIS — M54.16 LUMBAR RADICULOPATHY: ICD-10-CM

## 2024-03-20 DIAGNOSIS — M46.1 SI (SACROILIAC) JOINT INFLAMMATION: Primary | ICD-10-CM

## 2024-03-20 DIAGNOSIS — M43.16 SPONDYLOLISTHESIS OF LUMBAR REGION: ICD-10-CM

## 2024-03-20 PROCEDURE — 99214 OFFICE O/P EST MOD 30 MIN: CPT | Performed by: NEUROLOGICAL SURGERY

## 2024-03-20 NOTE — TELEPHONE ENCOUNTER
Called and talk to Kaci she stated she could not come in earlier due to having PT this morning already scheduled

## 2024-04-01 ENCOUNTER — OFFICE VISIT (OUTPATIENT)
Dept: OBSTETRICS AND GYNECOLOGY | Facility: CLINIC | Age: 69
End: 2024-04-01
Payer: COMMERCIAL

## 2024-04-01 VITALS
DIASTOLIC BLOOD PRESSURE: 80 MMHG | WEIGHT: 108 LBS | SYSTOLIC BLOOD PRESSURE: 126 MMHG | BODY MASS INDEX: 21.2 KG/M2 | HEIGHT: 60 IN

## 2024-04-01 DIAGNOSIS — Z01.419 ENCOUNTER FOR GYNECOLOGICAL EXAMINATION: Primary | ICD-10-CM

## 2024-04-01 DIAGNOSIS — M81.0 AGE-RELATED OSTEOPOROSIS WITHOUT CURRENT PATHOLOGICAL FRACTURE: ICD-10-CM

## 2024-04-01 DIAGNOSIS — Z12.11 COLON CANCER SCREENING: ICD-10-CM

## 2024-04-01 PROCEDURE — G0328 FECAL BLOOD SCRN IMMUNOASSAY: HCPCS | Performed by: OBSTETRICS & GYNECOLOGY

## 2024-04-01 PROCEDURE — 99397 PER PM REEVAL EST PAT 65+ YR: CPT | Performed by: OBSTETRICS & GYNECOLOGY

## 2024-04-02 RX ORDER — DENOSUMAB 60 MG/ML
INJECTION SUBCUTANEOUS
Qty: 1 ML | Refills: 0 | Status: SHIPPED | OUTPATIENT
Start: 2024-04-02

## 2024-04-02 NOTE — PROGRESS NOTES
[facility]  [address], [address]  [place], [place] [address]  Phone: [phone] / Fax:  [phone]      2024    [address] [address]    Jarod Hair MD    Chief Complaint   Patient presents with    Gynecologic Exam     Annual Exam, last pap 10-31-19 NL HPV (-), Mammogram 24-NL.  Colonoscopy 20 NL. DEXA 23-Osteoporosis of left hip.       Kaci Patrick is here for annual gynecologic exam.  HPI - Patient with history of normal pap tests.  She had a normal mammogram last month.  Her last colonoscopy was in  and was normal.  Patient had DEXA last year with osteoporosis.  She is currently on Prolia and has only received one dose.    Past Medical History:   Diagnosis Date    Arthritis     Asthma due to seasonal allergies     Broken ankle 2024    left    Cervical dysplasia     Insomnia     Osteopenia     Osteoporosis     PONV (postoperative nausea and vomiting)     Right hip pain     Resolved- Surgery 2022       Past Surgical History:   Procedure Laterality Date    APPENDECTOMY      CERVICAL CONE BIOPSY      CERVICAL POLYPECTOMY      COLONOSCOPY N/A 2020    Procedure: COLONOSCOPY TO CECUM;  Surgeon: Roseanne Cavanaugh MD;  Location: [facility] ENDOSCOPY;  Service: General;  Laterality: N/A;  PRE: SCREENING AND FAMILY H/O COLON CANCER  POST: POOR PREP    DILATATION AND CURETTAGE      SPINAL FUSION      TOTAL HIP ARTHROPLASTY Right 2022    Procedure: RIGHT TOTAL HIP ARTHROPLASTY;  Surgeon: Randal Covarrubias MD;  Location: [facility] MAIN OR;  Service: Orthopedics;  Laterality: Right;       No Known Allergies    Social History     Socioeconomic History    Marital status: Unknown   Tobacco Use    Smoking status: Former     Current packs/day: 0.00     Average packs/day: 1 pack/day for 25.0 years (25.0 ttl pk-yrs)     Types: Cigarettes     Start date: 10/29/1978     Quit date: 10/29/2003     Years since quittin.4    Smokeless tobacco: Never  "  Vaping Use    Vaping status: Never Used   Substance and Sexual Activity    Alcohol use: No    Drug use: No    Sexual activity: Yes     Partners: Male     Birth control/protection: Post-menopausal       Family History   Problem Relation Age of Onset    Kidney disease Mother     Colon cancer Father 87    Prostate cancer Father 65    Breast cancer Neg Hx     Malig Hyperthermia Neg Hx        No LMP recorded (lmp unknown). Patient is postmenopausal.    OB History          3    Para   2    Term   2            AB   1    Living   2         SAB   1    IAB        Ectopic        Molar        Multiple        Live Births                    Vitals:    24 1510   BP: 126/80   Weight: 49 kg (108 lb)   Height: 152.4 cm (60\")       Physical Exam  Constitutional:       Appearance: Normal appearance. She is well-developed.   Genitourinary:      Bladder and urethral meatus normal.      Right Labia: No tenderness or lesions.     Left Labia: No tenderness or lesions.     No vaginal discharge or tenderness.        Right Adnexa: not tender and not full.     Left Adnexa: not tender and not full.     No cervical motion tenderness or lesion.      Uterus is not enlarged or tender.      No urethral tenderness or hypermobility present.   Breasts:     Right: No mass or nipple discharge.      Left: No mass or nipple discharge.   HENT:      Right Ear: External ear normal.      Left Ear: External ear normal.      Nose: Nose normal.   Eyes:      Conjunctiva/sclera: Conjunctivae normal.   Neck:      Thyroid: No thyromegaly.   Cardiovascular:      Rate and Rhythm: Normal rate and regular rhythm.      Heart sounds: Normal heart sounds.   Pulmonary:      Effort: Pulmonary effort is normal.      Breath sounds: No stridor. No wheezing.   Abdominal:      Palpations: Abdomen is soft.      Tenderness: There is no abdominal tenderness. There is no guarding or rebound.   Musculoskeletal:         General: Normal range of motion.      Cervical " back: Normal range of motion and neck supple.   Neurological:      Mental Status: She is alert.      Coordination: Coordination normal.   Skin:     General: Skin is warm and dry.   Psychiatric:         Mood and Affect: Mood normal.         Behavior: Behavior normal.         Thought Content: Thought content normal.         Judgment: Judgment normal.   Vitals reviewed. Exam conducted with a chaperone present.         Diagnoses and all orders for this visit:    1. Encounter for gynecological examination (Primary)        -     Discussed importance of regular screening and breast awareness.  2. Colon cancer screening  -     POC Occult Blood Stool  -     FOBT negative.  3. Age-related osteoporosis without current pathological fracture        -     Continue Prolia.  Continue Calcium and Vitamin D.  Retest DEXA in 1 to 3 years.      Guille Frazier MD

## 2024-04-11 ENCOUNTER — TELEPHONE (OUTPATIENT)
Dept: OBSTETRICS AND GYNECOLOGY | Facility: CLINIC | Age: 69
End: 2024-04-11
Payer: COMMERCIAL

## 2024-04-11 NOTE — TELEPHONE ENCOUNTER
Provider: DR. LOPEZ    Caller: SHAWN LORA    Relationship to Patient: SELF    Pharmacy:     Phone Number: [phone]    Reason for Call: APPOINTMENT FOR PROLIA    When was the patient last seen: 04.01.24    PATIENT CALLING IN AND WOULD LIKE TO KNOW IF HER APPOINTMENT THAT IS SCHEDULED ON 04.29.24 @1 PM COULD MOVED UP TILL THE MORNING. PATIENT HAS AN APPOINTMENT THAT MORNING SOMEWHERE ELSE AND WOULD LIKE TO KNOW IF SHE CAN COME IN AND GET HER prolia INJECTION DONE AFTER THAT APPOINTMENT AROUND 10 AM.    PATIENT CAN BE REACHED [phone]    I DID CALL BEFORE SENDING MESSAGE    THANK YOU    .

## 2024-04-12 ENCOUNTER — TELEPHONE (OUTPATIENT)
Dept: OBSTETRICS AND GYNECOLOGY | Facility: CLINIC | Age: 69
End: 2024-04-12
Payer: COMMERCIAL

## 2024-04-12 NOTE — TELEPHONE ENCOUNTER
Left message to call office about Prolia, Pt was scheduled without us having the injection in office. Pt will also need labs a few days before the injection. I need to see if she has spoken with the pharmacy to set up delivery of injection to our office.

## 2024-04-29 ENCOUNTER — CLINICAL SUPPORT (OUTPATIENT)
Dept: OBSTETRICS AND GYNECOLOGY | Facility: CLINIC | Age: 69
End: 2024-04-29
Payer: COMMERCIAL

## 2024-04-29 ENCOUNTER — TELEPHONE (OUTPATIENT)
Dept: OBSTETRICS AND GYNECOLOGY | Facility: CLINIC | Age: 69
End: 2024-04-29
Payer: COMMERCIAL

## 2024-04-29 VITALS
BODY MASS INDEX: 21.4 KG/M2 | HEIGHT: 60 IN | DIASTOLIC BLOOD PRESSURE: 78 MMHG | WEIGHT: 109 LBS | SYSTOLIC BLOOD PRESSURE: 126 MMHG

## 2024-04-29 DIAGNOSIS — M81.0 AGE-RELATED OSTEOPOROSIS WITHOUT CURRENT PATHOLOGICAL FRACTURE: Primary | ICD-10-CM

## 2024-04-29 PROCEDURE — 96372 THER/PROPH/DIAG INJ SC/IM: CPT | Performed by: OBSTETRICS & GYNECOLOGY

## 2024-04-29 NOTE — PROGRESS NOTES
Patient is here for her  Prolia Injection of Right SQ, patient supplied, no reaction. Lot # [id number], Exp. 6-30-26, NDC 95805-493-46.

## 2024-04-29 NOTE — TELEPHONE ENCOUNTER
Spoke with patient regarding her labs showed an elevation in creatinine and this could impact kidney functions. Patient is going to contact her PCP for follow up.

## 2024-05-20 DIAGNOSIS — N95.2 ATROPHIC VAGINITIS: ICD-10-CM

## 2024-05-20 RX ORDER — ESTRADIOL 0.1 MG/G
2 CREAM VAGINAL DAILY
Qty: 42.5 G | Refills: 5 | Status: SHIPPED | OUTPATIENT
Start: 2024-05-20

## 2024-05-20 NOTE — TELEPHONE ENCOUNTER
Caller: Kaci Patrick    Relationship: Self    Best call back number: [phone]    Requested Prescriptions:   Requested Prescriptions     Pending Prescriptions Disp Refills    estradiol (ESTRACE VAGINAL) 0.1 MG/GM vaginal cream 42.5 g 5     Sig: Insert 2 g into the vagina Daily.      Pharmacy where request should be sent: [facility] [address] [place] [address] - [phone]  - [phone] FX     Last office visit with prescribing clinician: 4/1/2024     Next office visit with prescribing clinician: Visit date not found     Does the patient have less than a 3 day supply:  [] Yes  [x] No    Would you like a call back once the refill request has been completed: [] Yes [x] No

## 2024-06-24 ENCOUNTER — TELEPHONE (OUTPATIENT)
Dept: NEUROSURGERY | Facility: CLINIC | Age: 69
End: 2024-06-24
Payer: COMMERCIAL

## 2024-06-24 NOTE — TELEPHONE ENCOUNTER
Caller: Kaci Patrick    Relationship to patient: Self    Best call back number: [phone]    Patient is needing: PATIENT CALLED, REQUESTING APPT WITH [facility] OR [facility] TO DISCUSS GETTING A STIMULATOR PLACED. PATIENT LAST SAW JAMEE 03/20/24 FOR LUMBAR. DOES PATIENT NEED NEW REFERRAL? CAN PATIENT BE SCHEDULED WITH [facility] OR [facility]? PLEASE CALL PATIENT TO ADVISE.    THANK YOU

## 2024-06-24 NOTE — TELEPHONE ENCOUNTER
"Received message from routing messages. Reviewed patient's last visit with Dr. Ojeda on 03/12/2024. Per Dr. Ojeda \"I had a long discussion with the patient about the situation. Explained that is a very complex situation. The surgery is certainly a more complex surgery than I would normally do and so I would like to get her into see one of my partners who does more complex spine surgery. She is in agreement with that plan and I have discussed her case with Dr. Young. \"       Called patient. Informed her of the message above with her last office visit with . Informed patient that she can come back in to speak with  and discuss the stimulator. Patient wanted an appt with  or . Informed patient that would have to discuss with  and  to see if patient can go back to . Patient stated that she would just seek other doctors who can do stimulator as a second opinion. Once she has made her decision she will call the office back in regards to getting an appt with  or .   "

## 2024-09-20 ENCOUNTER — TELEPHONE (OUTPATIENT)
Dept: OBSTETRICS AND GYNECOLOGY | Facility: CLINIC | Age: 69
End: 2024-09-20
Payer: COMMERCIAL

## 2024-09-20 RX ORDER — ESTRADIOL 10 UG/1
1 INSERT VAGINAL 2 TIMES WEEKLY
Qty: 8 TABLET | Refills: 6 | Status: SHIPPED | OUTPATIENT
Start: 2024-09-23

## 2024-09-25 RX ORDER — DENOSUMAB 60 MG/ML
60 INJECTION SUBCUTANEOUS ONCE
Qty: 1 ML | Refills: 0 | Status: SHIPPED | OUTPATIENT
Start: 2024-09-25 | End: 2024-09-25

## 2024-10-22 ENCOUNTER — TELEPHONE (OUTPATIENT)
Dept: OBSTETRICS AND GYNECOLOGY | Facility: CLINIC | Age: 69
End: 2024-10-22
Payer: COMMERCIAL

## 2024-10-22 DIAGNOSIS — M81.0 AGE-RELATED OSTEOPOROSIS WITHOUT CURRENT PATHOLOGICAL FRACTURE: Primary | ICD-10-CM

## 2024-10-23 ENCOUNTER — LAB (OUTPATIENT)
Dept: OBSTETRICS AND GYNECOLOGY | Facility: CLINIC | Age: 69
End: 2024-10-23
Payer: COMMERCIAL

## 2024-10-23 DIAGNOSIS — M81.0 AGE-RELATED OSTEOPOROSIS WITHOUT CURRENT PATHOLOGICAL FRACTURE: ICD-10-CM

## 2024-10-24 LAB
25(OH)D3+25(OH)D2 SERPL-MCNC: 55.4 NG/ML (ref 30–100)
ALBUMIN SERPL-MCNC: 4.4 G/DL (ref 3.5–5.2)
ALBUMIN/GLOB SERPL: 1.8 G/DL
ALP SERPL-CCNC: 68 U/L (ref 39–117)
ALT SERPL-CCNC: 22 U/L (ref 1–33)
AST SERPL-CCNC: 28 U/L (ref 1–32)
BILIRUB SERPL-MCNC: 0.3 MG/DL (ref 0–1.2)
BUN SERPL-MCNC: 15 MG/DL (ref 8–23)
BUN/CREAT SERPL: 16.9 (ref 7–25)
CALCIUM SERPL-MCNC: 9.2 MG/DL (ref 8.6–10.5)
CHLORIDE SERPL-SCNC: 103 MMOL/L (ref 98–107)
CO2 SERPL-SCNC: 23.1 MMOL/L (ref 22–29)
CREAT SERPL-MCNC: 0.89 MG/DL (ref 0.57–1)
EGFRCR SERPLBLD CKD-EPI 2021: 69.8 ML/MIN/1.73
GLOBULIN SER CALC-MCNC: 2.4 GM/DL
GLUCOSE SERPL-MCNC: 109 MG/DL (ref 65–99)
POTASSIUM SERPL-SCNC: 4.4 MMOL/L (ref 3.5–5.2)
PROT SERPL-MCNC: 6.8 G/DL (ref 6–8.5)
SODIUM SERPL-SCNC: 136 MMOL/L (ref 136–145)

## 2024-10-30 ENCOUNTER — CLINICAL SUPPORT (OUTPATIENT)
Dept: OBSTETRICS AND GYNECOLOGY | Facility: CLINIC | Age: 69
End: 2024-10-30
Payer: COMMERCIAL

## 2024-10-30 DIAGNOSIS — Z13.820 OSTEOPOROSIS SCREENING: Primary | ICD-10-CM

## 2025-03-26 RX ORDER — ESTRADIOL 10 UG/1
1 TABLET VAGINAL 2 TIMES WEEKLY
Qty: 8 TABLET | Refills: 0 | Status: SHIPPED | OUTPATIENT
Start: 2025-03-27

## 2025-04-01 RX ORDER — DENOSUMAB 60 MG/ML
INJECTION SUBCUTANEOUS
Qty: 1 ML | Refills: 0 | Status: SHIPPED | OUTPATIENT
Start: 2025-04-01

## 2025-04-11 ENCOUNTER — OFFICE VISIT (OUTPATIENT)
Dept: OBSTETRICS AND GYNECOLOGY | Facility: CLINIC | Age: 70
End: 2025-04-11
Payer: COMMERCIAL

## 2025-04-11 ENCOUNTER — TELEPHONE (OUTPATIENT)
Dept: OBSTETRICS AND GYNECOLOGY | Facility: CLINIC | Age: 70
End: 2025-04-11
Payer: COMMERCIAL

## 2025-04-11 VITALS
SYSTOLIC BLOOD PRESSURE: 131 MMHG | DIASTOLIC BLOOD PRESSURE: 81 MMHG | HEIGHT: 60 IN | WEIGHT: 110 LBS | BODY MASS INDEX: 21.6 KG/M2

## 2025-04-11 DIAGNOSIS — Z12.11 COLON CANCER SCREENING: ICD-10-CM

## 2025-04-11 DIAGNOSIS — M81.0 AGE-RELATED OSTEOPOROSIS WITHOUT CURRENT PATHOLOGICAL FRACTURE: ICD-10-CM

## 2025-04-11 DIAGNOSIS — M81.0 AGE-RELATED OSTEOPOROSIS WITHOUT CURRENT PATHOLOGICAL FRACTURE: Primary | ICD-10-CM

## 2025-04-11 DIAGNOSIS — Z01.419 ENCOUNTER FOR GYNECOLOGICAL EXAMINATION: Primary | ICD-10-CM

## 2025-04-11 RX ORDER — FEXOFENADINE HCL 180 MG/1
180 TABLET ORAL
COMMUNITY
Start: 2025-01-07

## 2025-04-11 RX ORDER — DOCUSATE SODIUM 100 MG/1
100 CAPSULE, LIQUID FILLED ORAL
COMMUNITY
Start: 2025-01-07

## 2025-04-11 RX ORDER — ALBUTEROL SULFATE 90 UG/1
INHALANT RESPIRATORY (INHALATION) SEE ADMIN INSTRUCTIONS
COMMUNITY

## 2025-04-11 RX ORDER — HYDROCODONE BITARTRATE AND ACETAMINOPHEN 7.5; 325 MG/1; MG/1
1 TABLET ORAL 3 TIMES DAILY
COMMUNITY
Start: 2024-11-01

## 2025-04-11 NOTE — TELEPHONE ENCOUNTER
----- Message from Guille Frazier sent at 4/11/2025  1:07 PM EDT -----  Caitlin - She should continue on Prolia.  Can you help arrange?

## 2025-04-11 NOTE — TELEPHONE ENCOUNTER
Insurance this year is stating not covered under pharmacy benefit. I will send order to EP and patient will await their call to schedule.fuad

## 2025-04-11 NOTE — PROGRESS NOTES
[facility]  [address], [address]  [place], [place] [address]  Phone: [phone] / Fax:  [phone]      04/11/2025    [address] [address]    Jarod Hair MD    Chief Complaint   Patient presents with    Gynecologic Exam     Annual Exam, last pap 10-31-19 NL HPV (-), Mammogram 2-2-24-NL.,she is scheduled for May.  Colonoscopy 7-2-20 NL, repeat this year and patient planning to proceed this year.  DEXA 12-16-24 neck -osteopenia and arm- osteoporosis.       Kaci Patrick is here for annual gynecologic exam.  HPI - Patient with history of normal pap test with last testing completed in 2019 and normal; no further testing due to age.  She had normal mammogram one year ago and is scheduled for follow up in May 2025.  She had a normal colonoscopy in 2020 and is planning to complete this year.  She has osteoporosis and is on Prolia.    Past Medical History:   Diagnosis Date    Arthritis     Asthma due to seasonal allergies     Broken ankle 01/2024    left    Cervical dysplasia     Insomnia     Osteopenia     Osteoporosis     PONV (postoperative nausea and vomiting)     Right hip pain     Resolved- Surgery 4/2022       Past Surgical History:   Procedure Laterality Date    APPENDECTOMY      CERVICAL CONE BIOPSY      CERVICAL POLYPECTOMY      COLONOSCOPY N/A 7/2/2020    Procedure: COLONOSCOPY TO CECUM;  Surgeon: Roseanne Cavanaugh MD;  Location: [facility] ENDOSCOPY;  Service: General;  Laterality: N/A;  PRE: SCREENING AND FAMILY H/O COLON CANCER  POST: POOR PREP    DILATATION AND CURETTAGE      SPINAL FUSION      TOTAL HIP ARTHROPLASTY Right 4/14/2022    Procedure: RIGHT TOTAL HIP ARTHROPLASTY;  Surgeon: Randal Covarrubias MD;  Location: [facility] MAIN OR;  Service: Orthopedics;  Laterality: Right;       No Known Allergies    Social History     Socioeconomic History    Marital status:    Tobacco Use    Smoking status: Former     Current packs/day: 0.00     Average packs/day: 1  "pack/day for 25.0 years (25.0 ttl pk-yrs)     Types: Cigarettes     Start date: 10/29/1978     Quit date: 10/29/2003     Years since quittin.4    Smokeless tobacco: Never   Vaping Use    Vaping status: Never Used   Substance and Sexual Activity    Alcohol use: No    Drug use: No    Sexual activity: Yes     Partners: Male     Birth control/protection: Post-menopausal       Family History   Problem Relation Age of Onset    Kidney disease Mother     Colon cancer Father 87    Prostate cancer Father 65    Breast cancer Neg Hx     Malig Hyperthermia Neg Hx        No LMP recorded (lmp unknown). Patient is postmenopausal.    OB History          3    Para   2    Term   2            AB   1    Living   2         SAB   1    IAB        Ectopic        Molar        Multiple        Live Births                    Vitals:    25 1032   BP: 131/81   Weight: 49.9 kg (110 lb)   Height: 152.4 cm (60\")       Physical Exam  Constitutional:       Appearance: Normal appearance. She is well-developed.   Genitourinary:      Bladder and urethral meatus normal.      Right Labia: No tenderness or lesions.     Left Labia: No tenderness or lesions.     No vaginal discharge or tenderness.        Right Adnexa: not tender and not full.     Left Adnexa: not tender and not full.     No cervical motion tenderness or lesion.      Uterus is not enlarged or tender.      No urethral tenderness present.   Rectum:      No rectal mass or tenderness.   Breasts:     Right: No mass or nipple discharge.      Left: No mass or nipple discharge.   HENT:      Right Ear: External ear normal.      Left Ear: External ear normal.      Nose: Nose normal.   Eyes:      Conjunctiva/sclera: Conjunctivae normal.   Neck:      Thyroid: No thyromegaly.   Cardiovascular:      Rate and Rhythm: Normal rate and regular rhythm.      Heart sounds: Normal heart sounds.   Pulmonary:      Effort: Pulmonary effort is normal.      Breath sounds: No stridor. No " wheezing.   Abdominal:      Palpations: Abdomen is soft.      Tenderness: There is no abdominal tenderness. There is no guarding or rebound.   Musculoskeletal:         General: Normal range of motion.      Cervical back: Normal range of motion and neck supple.   Neurological:      Mental Status: She is alert.      Coordination: Coordination normal.   Skin:     General: Skin is warm and dry.   Psychiatric:         Mood and Affect: Mood normal.         Behavior: Behavior normal.         Thought Content: Thought content normal.         Judgment: Judgment normal.   Vitals reviewed. Exam conducted with a chaperone present.         Diagnoses and all orders for this visit:    1. Encounter for gynecological examination (Primary)        -     Discussed importance of regular screening and breast awareness.  2. Colon cancer screening  -     POC Occult Blood Stool  -     Patient with negative testing and plans to follow thru with colonoscopy.  3. Age-related osteoporosis without current pathological fracture        -     Patient with stability/improvement in hip DEXA score.  Forearm measurements with osteoporosis.  Patient to continue Prolia.  Patient to request forearm measurements going forward.      Guille Frazier MD

## 2025-04-21 RX ORDER — ESTRADIOL 10 UG/1
1 TABLET VAGINAL 2 TIMES WEEKLY
Qty: 8 TABLET | Refills: 0 | Status: SHIPPED | OUTPATIENT
Start: 2025-04-21

## 2025-04-21 NOTE — TELEPHONE ENCOUNTER
[facility]  Pharmacy -   [facility] [address] [place] [address] - [address] [place] [address] - [phone]  - [phone] FX

## 2025-04-22 ENCOUNTER — TELEPHONE (OUTPATIENT)
Dept: OBSTETRICS AND GYNECOLOGY | Facility: CLINIC | Age: 70
End: 2025-04-22
Payer: COMMERCIAL

## 2025-04-22 NOTE — TELEPHONE ENCOUNTER
Caitlin,    Patient is calling to confirm that she can get the next prolia. And then her Dentist said not to get another one for 8 weeks after this one due to needing a tooth pulled. She wanted to inform you.     Wandy

## 2025-04-23 ENCOUNTER — TELEPHONE (OUTPATIENT)
Dept: OBSTETRICS AND GYNECOLOGY | Facility: CLINIC | Age: 70
End: 2025-04-23
Payer: COMMERCIAL

## 2025-04-23 NOTE — TELEPHONE ENCOUNTER
Prolia is now at [facility]. Patient is now needing to have a tooth pulled and the dentist wants her to wait for 8 weeks after the extraction to have her Prolia.  Pt will call back to schedule labs and injection once she is scheduled for her tooth extraction. fuad

## 2025-04-23 NOTE — TELEPHONE ENCOUNTER
"  Caller: Kaci Patrick \"Kaci\"  Female, 69 y.o., 1955  MRN: [id number]  CSN: [id number]  Phone: [phone]    Relationship: SELF      What is the best time to reach you: ANY    Who are you requesting to speak with (clinical staff, provider,  specific staff member): MIKE    Do you know the name of the person who called: MIKE    PT REQ A CALL BACK FROM MIKE UNABLE TO WARM TRANSFER  "

## 2025-05-14 ENCOUNTER — TELEPHONE (OUTPATIENT)
Dept: OBSTETRICS AND GYNECOLOGY | Facility: CLINIC | Age: 70
End: 2025-05-14
Payer: COMMERCIAL

## 2025-05-14 DIAGNOSIS — M81.0 AGE-RELATED OSTEOPOROSIS WITHOUT CURRENT PATHOLOGICAL FRACTURE: Primary | ICD-10-CM

## 2025-05-14 NOTE — TELEPHONE ENCOUNTER
Hub staff attempted to follow warm transfer process and was unsuccessful     Caller: Kaci Patrick    Relationship to patient: Self    Best call back number: [phone]     Patient is needing: PATIENT CALLING TO LET DR. JOHN RUST'S NURSE, KNOW THAT SHE HAD HER TOOTH EXTRACTION COMPLETED YESTERDAY.  PATIENT STATES BARRERA WAS GOING TO FIGURE OUT WHEN PATIENT IS DUE TO START THE PROLIA BACK SINCE SHE HAD TO STOP PRIOR TO THE DENTAL WORK.

## 2025-05-14 NOTE — TELEPHONE ENCOUNTER
Tooth extraction and you are going to figure out when she is due to start the prolia back. Thank you

## 2025-05-19 RX ORDER — ESTRADIOL 10 UG/1
1 TABLET VAGINAL 2 TIMES WEEKLY
Qty: 8 TABLET | Refills: 2 | Status: SHIPPED | OUTPATIENT
Start: 2025-05-19

## 2025-06-30 ENCOUNTER — LAB (OUTPATIENT)
Dept: OBSTETRICS AND GYNECOLOGY | Facility: CLINIC | Age: 70
End: 2025-06-30
Payer: COMMERCIAL

## 2025-06-30 DIAGNOSIS — M81.0 AGE-RELATED OSTEOPOROSIS WITHOUT CURRENT PATHOLOGICAL FRACTURE: ICD-10-CM

## 2025-07-08 ENCOUNTER — TELEPHONE (OUTPATIENT)
Dept: OBSTETRICS AND GYNECOLOGY | Facility: CLINIC | Age: 70
End: 2025-07-08
Payer: COMMERCIAL

## 2025-07-10 ENCOUNTER — CLINICAL SUPPORT (OUTPATIENT)
Dept: OBSTETRICS AND GYNECOLOGY | Facility: CLINIC | Age: 70
End: 2025-07-10
Payer: COMMERCIAL

## 2025-07-10 VITALS
WEIGHT: 110 LBS | SYSTOLIC BLOOD PRESSURE: 144 MMHG | HEIGHT: 60 IN | DIASTOLIC BLOOD PRESSURE: 86 MMHG | BODY MASS INDEX: 21.6 KG/M2

## 2025-07-10 DIAGNOSIS — M81.0 AGE-RELATED OSTEOPOROSIS WITHOUT CURRENT PATHOLOGICAL FRACTURE: Primary | ICD-10-CM

## 2025-07-10 NOTE — PATIENT INSTRUCTIONS
Tolerated prolia injection without a reaction left arm patient supply    NDC 28476-151-81    LOT [id number]    EXP 07/31/2027

## 2025-08-04 RX ORDER — ESTRADIOL 10 UG/1
1 TABLET VAGINAL 2 TIMES WEEKLY
Qty: 8 TABLET | Refills: 0 | Status: SHIPPED | OUTPATIENT
Start: 2025-08-04

## (undated) DEVICE — DECANT BG O JET

## (undated) DEVICE — PENCL ES MEGADINE EZ/CLEAN BUTN W/HOLSTR 10FT

## (undated) DEVICE — SUT MNCRYL 3/0 PS2 18IN MCP497G

## (undated) DEVICE — CANN O2 ETCO2 FITS ALL CONN CO2 SMPL A/ 7IN DISP LF

## (undated) DEVICE — DRAPE,REIN 53X77,STERILE: Brand: MEDLINE

## (undated) DEVICE — NEEDLE, QUINCKE, 20GX3.5": Brand: MEDLINE

## (undated) DEVICE — ANTIBACTERIAL UNDYED BRAIDED (POLYGLACTIN 910), SYNTHETIC ABSORBABLE SUTURE: Brand: COATED VICRYL

## (undated) DEVICE — SENSR O2 OXIMAX FNGR A/ 18IN NONSTR

## (undated) DEVICE — 3M™ IOBAN™ 2 ANTIMICROBIAL INCISE DRAPE 6648EZ: Brand: IOBAN™ 2

## (undated) DEVICE — HEWSON SUTURE RETRIEVER: Brand: HEWSON SUTURE RETRIEVER

## (undated) DEVICE — APPL CHLORAPREP HI/LITE 26ML ORNG

## (undated) DEVICE — GLV SURG PREMIERPRO ORTHO LTX PF SZ7.5 BRN

## (undated) DEVICE — KT ORCA ORCAPOD DISP STRL

## (undated) DEVICE — HANDPIECE SET WITH COAXIAL HIGH FLOW TIP AND SUCTION TUBE: Brand: INTERPULSE

## (undated) DEVICE — RECIPROCATING BLADE HEAVY DUTY LONG, OFFSET  (77.6 X 0.77 X 11.2MM)

## (undated) DEVICE — ADAPT CLN BIOGUARD AIR/H2O DISP

## (undated) DEVICE — GLV SURG BIOGEL LTX PF 7 1/2

## (undated) DEVICE — PK HIP TOTL 40

## (undated) DEVICE — THE TORRENT IRRIGATION SCOPE CONNECTOR IS USED WITH THE TORRENT IRRIGATION TUBING TO PROVIDE IRRIGATION FLUIDS SUCH AS STERILE WATER DURING GASTROINTESTINAL ENDOSCOPIC PROCEDURES WHEN USED IN CONJUNCTION WITH AN IRRIGATION PUMP (OR ELECTROSURGICAL UNIT).: Brand: TORRENT

## (undated) DEVICE — SYR LUERLOK 30CC

## (undated) DEVICE — DRSNG WND GZ PAD BORDERED 4X8IN STRL

## (undated) DEVICE — TUBING, SUCTION, 1/4" X 10', STRAIGHT: Brand: MEDLINE

## (undated) DEVICE — GOWN ,SIRUS,NONREINFORCED SMALL: Brand: MEDLINE

## (undated) DEVICE — DRSNG GZ PETROLTM XEROFORM CURAD 1X8IN STRL

## (undated) DEVICE — SUT TEVDEX 5 K61 30IN 79745